# Patient Record
Sex: FEMALE | Race: WHITE | NOT HISPANIC OR LATINO | Employment: OTHER | URBAN - METROPOLITAN AREA
[De-identification: names, ages, dates, MRNs, and addresses within clinical notes are randomized per-mention and may not be internally consistent; named-entity substitution may affect disease eponyms.]

---

## 2017-01-11 ENCOUNTER — ALLSCRIPTS OFFICE VISIT (OUTPATIENT)
Dept: OTHER | Facility: OTHER | Age: 42
End: 2017-01-11

## 2017-05-03 ENCOUNTER — LAB REQUISITION (OUTPATIENT)
Dept: LAB | Facility: HOSPITAL | Age: 42
End: 2017-05-03
Payer: MEDICARE

## 2017-05-03 ENCOUNTER — ALLSCRIPTS OFFICE VISIT (OUTPATIENT)
Dept: OTHER | Facility: OTHER | Age: 42
End: 2017-05-03

## 2017-05-03 DIAGNOSIS — R10.2 PELVIC AND PERINEAL PAIN: ICD-10-CM

## 2017-05-03 DIAGNOSIS — Z01.419 ENCOUNTER FOR GYNECOLOGICAL EXAMINATION WITHOUT ABNORMAL FINDING: ICD-10-CM

## 2017-05-03 DIAGNOSIS — N64.4 MASTODYNIA: ICD-10-CM

## 2017-05-03 DIAGNOSIS — Z12.31 ENCOUNTER FOR SCREENING MAMMOGRAM FOR MALIGNANT NEOPLASM OF BREAST: ICD-10-CM

## 2017-05-03 PROCEDURE — G0145 SCR C/V CYTO,THINLAYER,RESCR: HCPCS | Performed by: OBSTETRICS & GYNECOLOGY

## 2017-05-09 ENCOUNTER — ALLSCRIPTS OFFICE VISIT (OUTPATIENT)
Dept: OTHER | Facility: OTHER | Age: 42
End: 2017-05-09

## 2017-05-11 LAB
LAB AP GYN PRIMARY INTERPRETATION: NORMAL
LAB AP LMP: NORMAL
Lab: NORMAL
PATH INTERP SPEC-IMP: NORMAL

## 2017-11-03 ENCOUNTER — GENERIC CONVERSION - ENCOUNTER (OUTPATIENT)
Dept: OTHER | Facility: OTHER | Age: 42
End: 2017-11-03

## 2017-11-09 ENCOUNTER — LAB REQUISITION (OUTPATIENT)
Dept: LAB | Facility: HOSPITAL | Age: 42
End: 2017-11-09
Payer: MEDICARE

## 2017-11-09 ENCOUNTER — ALLSCRIPTS OFFICE VISIT (OUTPATIENT)
Dept: OTHER | Facility: OTHER | Age: 42
End: 2017-11-09

## 2017-11-09 DIAGNOSIS — R87.612 LOW GRADE SQUAMOUS INTRAEPITHELIAL LESION ON CYTOLOGIC SMEAR OF CERVIX (LGSIL): ICD-10-CM

## 2017-11-09 PROCEDURE — G0145 SCR C/V CYTO,THINLAYER,RESCR: HCPCS | Performed by: OBSTETRICS & GYNECOLOGY

## 2017-11-11 NOTE — PROGRESS NOTES
Assessment    1  Female pelvic pain (625 9) (R10 2)   2  LGSIL on Pap smear of cervix (795 03) (R87 447)   3  Encounter for routine gynecological examination (V72 31) (Z01 419)    Plan  Encounter for routine gynecological examination    · Follow-up visit in 6 months Evaluation and Treatment  Follow-up  Status: Hold For -Scheduling  Requested for: 58VCY0072   Ordered;Encounter for routine gynecological examination; Ordered By: Nichol Alfred Performed:  Due: 27IJH4176  LGSIL on Pap smear of cervix    · (1) THIN PREP PAP WITH IMAGING; Status: In Progress - Specimen/DataCollected,Retrospective By Protocol Authorization;   Done: [id number]   Perform:[facility] In Office Collection; Order Comments:Cervical/Endocervical; Last Updated By:Bennie Wilkinson; 11/9/2017 11:40:39 AM;Ordered;on Pap smear of cervix; Ordered By:Lynsey De Leon; Maturation index required? : No  : 11/03/2017  HPV? : Not Requested    Discussion/Summary  Discussion Summary:   TOPIC: ABNORMAL PAP SMEAR & PELVIC PAIN    Normal GYN Exam  check f/u PELVIC US to evaluate the pelvis and r/o Ovarian Cysts    Pap SMEar DOne    Follow-up Visit in 6 months  if any problems develop during the interim  questions were answered  Goals and Barriers: The patient has the current Goals: GOod GYN Health  CHeck Pelvic PAin  None  Patient's Capacity to Self-Care: Patient is able to Self-Care  Patient Education: Evaluate Pelvic PAin and F/u Pap SMear  Medication SE Review and Pt Understands Tx: The treatment plan was reviewed with the patient/guardian   The patient/guardian understands and agrees with the treatment plan   Self Referrals:   Self Referrals: No      Chief Complaint    1  Pelvic Pain  Chief Complaint Free Text Note Form: Acute Visitmonth pap LGSILc/o pelvic pain, hx of ovarian cyst      History of Present Illness  HPI: Patient reporting intermittent Pelvic Discomfort    Had a GI Evaluation and nothing was found    Suggestions that there were probably pelvic adhesions present    Periods are fairly predictable    No erratic bleeding noted  For follow-up Pap SMear Today as well      Active Problems    1  Abnormal mammogram (793 80) (R92 8)   2  Abnormal uterine bleeding (AUB) (626 9) (N93 9)   3  Anxiety (300 00) (F41 9)   4  ASCUS favoring benign (796 9)   5  ASCUS with positive high risk HPV (796 9)   6  Breast pain (611 71) (N64 4)   7  Burning Sensation During Urination   8  Burning with urination (788 1) (R30 0)   9  Candidiasis (112 9) (B37 9)   10  Encounter for routine gynecological examination (V72 31) (Z01 419)   11  Endometrial thickening on ultra sound (793 5) (R93 8)   12  Fatigue (780 79) (R53 83)   13  Female pelvic pain (625 9) (R10 2)   14  Galactorrhea not associated with childbirth (611 6) (N64 3)   15  Hirsutism (704 1) (L68 0)   16  Irregular menstrual cycle (626 4) (N92 6)   17  LGSIL on Pap smear of cervix (795 03) (R87 612)   18  Mastodynia (611 71) (N64 4)   19  Microhematuria (599 72) (R31 29)   20  Nipple discharge (611 79) (N64 52)   21  Ovarian cyst (620 2) (N83 20)   22  Pap smear abnormality of cervix with ASCUS favoring benign (795 01) (R87 610)   23  Pap smear, as part of routine gynecological examination (V76 2) (Z01 419)   24  Papillomavirus as the cause of diseases classified to other chapters (079 4) (B97 7)   25  Pelvic inflammatory disease (614 9) (N73 9)   26  Polycystic ovaries (256 4) (E28 2)   27  Premenstrual symptom (625 4) (N94 3)   28  Prolonged menstrual cycle (626 2) (N92 1)   29  Routine Gynecological Exam With Cervical Pap Smear   30  Screening for osteoporosis (V82 81) (Z13 820)   31  Screening mammogram for high-risk patient (V76 11) (Z12 31)   32  Sebaceous cyst (706 2) (L72 3)   33  Trichomoniasis (131 9) (A59 9)   34  Urinary frequency (788 41) (R35 0)   35  Urinary symptom or sign (788 99) (R39 9)   36  UTI symptoms (788 99) (R39 9)   37  Vaginal burning (625 8) (N94 9)   38   Vaginal discharge (623  5) (N89 8)   39  Vaginal itching (698 1) (L29 8)   40  Vaginal Itching (625 8)   41  Visit for screening mammogram (V76 12) (Z12 31)    Past Medical History    1  History of Encounter for routine gynecological examination (2 31) (Z01 419)   2  History of Encounter for routine gynecological examination (V72 31) (Z01 419)   3  History of Encounter for screening mammogram for malignant neoplasm of breast (V76 12) (Z12 31)   4  History of gastroesophageal reflux (GERD) (V12 79) (Z87 19)   5  History of human papillomavirus infection (V12 09) (Z86 19)   6  History of hypertension (V12 59) (Z86 79)   7  History of migraine (V12 49) (Z86 69)   8  History of thrombocytopenia (V12 3) (Z86 2)   9  History of vaginal discharge (V13 29) (Z87 42)   10  History of Human Papilloma Virus Infection Type 16 (079 4)   11  History of Ovarian cyst (620 2) (N83 20)   12  History of Pap smear, as part of routine gynecological examination (V76 2) (Z01 419)   13  History of Reported Positive Pap Smear (795 19)   14  History of Vaginal discharge (623 5) (N89 8)   15  History of Visit for screening mammogram (V76 12) (Z12 31)    Surgical History  1  History of Ankle Surgery   2  History of Breast Surgery Puncture Aspiration Of Cyst Right Breast   3  History of  Section   4  History of Cholecystectomy   5  History of Colposcopy Cervix With Biopsy(S) With Endocervical Curettage   6  History of Complete Colonoscopy   7  History of Dilation And Curettage   8  History of Excision For Hidradenitis   9  History of Incisional Breast Biopsy   10  History of Knee Arthroscopy (Therapeutic)   11  History of Oral Surgery Tooth Extraction   12  History of Salpingectomy For Ectopic Pregnancy   13  History of Tonsillectomy    Family History  Mother    1  Family history of arthritis (V17 7) (Z82 61)   2  Family history of asthma (V17 5) (Z82 5)   3  Family history of Crohn's disease (V18 59) (Z83 79)   4   Family history of hypertension (V17 49) (Z82 49)   5  Family history of hypothyroidism (V18 19) (Z83 49)  Father    10  Family history of Diabetes Mellitus (V18 0)   7  Family history of arthritis (V17 7) (Z82 61)   8  Family history of asthma (V17 5) (Z82 5)   9  Family history of hypertension (V17 49) (Z82 49)   10  Family history of Heart Disease (V17 49)  Daughter    6  Family history of polycystic ovarian syndrome (V18 7) (Z84 2)  Maternal Grandmother    12  Family history of Osteoporosis (V17 81)  Paternal Grandmother    15  Family history of Diabetes Mellitus (V18 0)   14  Family history of Heart Disease (V17 49)  Paternal Grandfather    13  Family history of Diabetes Mellitus (V18 0)   16  Family history of Heart Disease (V17 49)  Maternal Aunt    16  Family history of Breast Cancer (V16 3)   18  Family history of Breast Cancer (V16 3)  Paternal Uncle    23  Family history of Gastric Cancer (V16 0)   20  Family history of Heart Disease (V17 49)    Social History     · Being A Social Drinker   · Caffeine use   · Never A Smoker   · No drug use    Current Meds   1  Aciphex 20 MG Oral Tablet Delayed Release; Therapy: (Recorded:01Jul2013) to Recorded   2  Cambia PACK; Therapy: (Nery Roughen) to Recorded   3  Clindamycin Phosphate 1 % External Gel; Therapy: (Recorded:15Mar2016) to Recorded   4  Estrace 0 1 MG/GM Vaginal Cream; 1/4 applicator twice a week; Therapy: 65CIV8043 to (Last Rx:80Rat9625)  Requested for: 58Wbb4029 Ordered   5  Flexeril 10 MG TABS; Therapy: (Recorded:15Mar2016) to Recorded   6  Iron 65 MG TABS; Therapy: (Benay Roughen) to Recorded   7  Loratadine 10 MG Oral Tablet; Therapy: (Recorded:15Mar2016) to Recorded   8  Magnesium 500 MG Oral Capsule; Therapy: (Recorded:29Fjo1070) to Recorded   9  MetFORMIN HCl - 500 MG Oral Tablet; Therapy: (([phone] [phone]) to Recorded   10  Multivitamins TABS; Therapy: (Benay Roughen) to Recorded   11  Phentermine HCl - 37 5 MG Oral Capsule;   Therapy: (Recorded:15Mar2016) to Recorded   12  Sertraline HCl - 50 MG Oral Tablet; TAKE 1 TABLET DAILY; Therapy: 83GXE9013 to (Priscilla Keating)  Requested for: 75PKU2201; Last  Rx:66Aow8300 Ordered   13  Topiramate 50 MG Oral Tablet; Therapy: (Recorded:64Oje3491) to Recorded   14  Vitamin B-12 1000 MCG Oral Tablet; Therapy: (Maya La Coste) to Recorded   15  Zoloft 25 MG Oral Tablet; TAKE 1 TABLET DAILY; Therapy: 10MJC0542 to (Evaluate:37Koh7917)  Requested for: 61AYE4837; Last  Rx:02Fgg6330 Ordered    Allergies  1  Cipro SOLN   2  Keflex CAPS   3  Ceclor CAPS   4  Codeine Derivatives   5  Compazine TABS   6  Demerol TABS   7  Percocet TABS   8  Sulfa Drugs  9  Latex   10  Adhesive Tape    Vitals  Vital Signs    Recorded: 14STE6365 76:48DI   Systolic 043   Diastolic 74   Height 5 ft 8 in   Weight 239 lb 6 oz   BMI Calculated 36 4   BSA Calculated 2 21   LMP 1103/2017       Physical Exam   Constitutional  General appearance: No acute distress, well appearing and well nourished  Genitourinary  External genitalia: Normal and no lesions appreciated  Vagina: Normal, no lesions or dryness appreciated  Urethra: Normal    Urethral meatus: Normal    Bladder: Normal, soft, non-tender and no prolapse or masses appreciated  Cervix: Normal, no palpable masses  Uterus: Normal, non-tender, not enlarged, and no palpable masses  Adnexa/parametria: Normal, non-tender and no fullness or masses appreciated  Psychiatric  Orientation to person, place, and time: Normal    Mood and affect: Normal        Provider Comments  Provider Comments: To [place] today    Son doing well at [facility]    Date/Time Provider Specialty Site   05/09/2018 03:30 PM JONATHAN Thorpe   Obstetrics/Gynecology [facility]       Signatures   Electronically signed by : JONATHAN Neely ; Nov 10 2017 12:37PM EST                       (Author)

## 2017-11-13 NOTE — PROCEDURES
Active Problems     1  Abnormal mammogram (793 80) (R92 8)   2  Abnormal uterine bleeding (AUB) (626 9) (N93 9)   3  Anxiety (300 00) (F41 9)   4  ASCUS favoring benign (796 9)   5  ASCUS with positive high risk HPV (796 9)   6  Breast pain (611 71) (N64 4)   7  Burning Sensation During Urination   8  Burning with urination (788 1) (R30 0)   9  Candidiasis (112 9) (B37 9)   10  Endometrial thickening on ultra sound (793 5) (R93 8)   11  Fatigue (780 79) (R53 83)   12  Galactorrhea not associated with childbirth (611 6) (N64 3)   13  Hirsutism (704 1) (L68 0)   14  Irregular menstrual cycle (626 4) (N92 6)   15  Mastodynia (611 71) (N64 4)   16  Microhematuria (599 72) (R31 29)   17  Nipple discharge (611 79) (N64 52)   18  Ovarian cyst (620 2) (N83 20)   19  Pap smear abnormality of cervix with ASCUS favoring benign (795 01) (R87 610)   20  Pap smear, as part of routine gynecological examination (V76 2) (Z01 419)   21  Pelvic inflammatory disease (614 9) (N73 9)   22  Polycystic ovaries (256 4) (E28 2)   23  Premenstrual symptom (625 4) (N94 3)   24  Prolonged menstrual cycle (626 2) (N92 1)   25  Routine Gynecological Exam With Cervical Pap Smear   26  Screening for osteoporosis (V82 81) (Z13 820)   27  Screening mammogram for high-risk patient (V76 11) (Z12 31)   28  Sebaceous cyst (706 2) (L72 3)   29  Trichomoniasis (131 9) (A59 9)   30  Urinary frequency (788 41) (R35 0)   31  Urinary symptom or sign (788 99) (R39 9)   32  UTI symptoms (788 99) (R39 9)   33  Vaginal burning (625 8) (N94 9)   34  Vaginal discharge (623 5) (N89 8)   35  Vaginal itching (698 1) (L29 8)   36  Vaginal Itching (625 8)   37   Visit for screening mammogram (V76 12) (Z12 31)  Female pelvic pain (625 9) (R10 2)     Papillomavirus as the cause of diseases classified to other chapters (079 4) (B97 7)     Encounter for routine gynecological examination (V72 31) (Z01 419)     LGSIL on Pap smear of cervix (795 03) (R66 372)       Current Meds    1  Sertraline HCl - 50 MG Oral Tablet; TAKE 1 TABLET DAILY; Therapy: 10REF3777 to (Vicente Estrada)  Requested for: 27CLU4271; Last NB:44PQA3633 Ordered   2  Zoloft 25 MG Oral Tablet; TAKE 1 TABLET DAILY; Therapy: 75WPV8591 to (Evaluate:55Xjk9323)  Requested for: 67YPA0390; Last Rx:17Jan2017 Ordered    3  Estrace 0 1 MG/GM Vaginal Cream; 1/4 applicator twice a week; Therapy: 55STR9108 to (Last Rx:81Hcf8096)  Requested for: 21Dec2016 Ordered    4  Aciphex 20 MG Oral Tablet Delayed Release; Therapy: (Recorded:01Jul2013) to Recorded   5  Cambia PACK; Therapy: (Bridger Siskin) to Recorded   6  Clindamycin Phosphate 1 % External Gel; Therapy: (Recorded:15Mar2016) to Recorded   7  Flexeril 10 MG TABS; Therapy: (Recorded:15Mar2016) to Recorded   8  Iron 65 MG TABS; Therapy: (Millcreek Siskin) to Recorded   9  Loratadine 10 MG Oral Tablet; Therapy: (Recorded:15Mar2016) to Recorded   10  Magnesium 500 MG Oral Capsule; Therapy: (Recorded:01Jul2013) to Recorded   11  MetFORMIN HCl - 500 MG Oral Tablet; Therapy: ([id number]) to Recorded   12  Multivitamins TABS; Therapy: (Millcreek Siskin) to Recorded   13  Phentermine HCl - 37 5 MG Oral Capsule; Therapy: (Recorded:15Mar2016) to Recorded   14  Topiramate 50 MG Oral Tablet; Therapy: (Recorded:01Jul2013) to Recorded   15  Vitamin B-12 1000 MCG Oral Tablet; Therapy: (Recorded:23Apr2015) to Recorded    Allergies  1  Cipro SOLN   2  Keflex CAPS   3  Ceclor CAPS   4  Codeine Derivatives   5  Compazine TABS   6  Demerol TABS   7  Percocet TABS   8  Sulfa Drugs    9  Latex   10  Adhesive Tape    Results/Data  Transvaginal Ultrasound:  Procedure: The study was done today in the office  Indication: Pain  Findings:  Uterus: leiomyoma--   UT=88 x 66 x 55 mm  Ovaries:  RT OV=29 x 26 x 15 mm,LT OV=24 x 22 x 19 mm  Endometrium:  5mm  Cervix:  WNL  Pelvic Stuctures:  WNL  Impression:  Small Fibroidsx 22 x 21 mmx 23 x 23 mm    Patient Status: the patient tolerated the procedure well  Complications:  there were no complications  Future Appointments    Date/Time Provider Specialty Site   05/09/2018 03:30 PM JONATHAN Raphael   Obstetrics/Gynecology [facility]       Signatures   Electronically signed by : Jann Acosta, ; Nov 9 2017 12:47PM EST                       (Author)    Electronically signed by : JONATHAN Verde ; Nov 12 2017  3:21PM EST                       (Author)

## 2018-01-12 NOTE — MISCELLANEOUS
Message  Spoke with patient regarding culture results  All negative        Signatures   Electronically signed by : Severo Duran, [facility]; Oct 18 2016  3:22PM EST                       (Author)

## 2018-01-13 VITALS
DIASTOLIC BLOOD PRESSURE: 76 MMHG | SYSTOLIC BLOOD PRESSURE: 102 MMHG | BODY MASS INDEX: 33.83 KG/M2 | HEIGHT: 68 IN | WEIGHT: 223.25 LBS

## 2018-01-13 VITALS
DIASTOLIC BLOOD PRESSURE: 88 MMHG | HEIGHT: 68 IN | SYSTOLIC BLOOD PRESSURE: 122 MMHG | WEIGHT: 221 LBS | BODY MASS INDEX: 33.49 KG/M2

## 2018-01-13 NOTE — MISCELLANEOUS
Message  Spoke with patient regarding culture results  All negative  Patient states she is still having discharge and dark urine  She is nearly finished her course of metronidazole  Recommended follow-up with PCP        Signatures   Electronically signed by : ADRIÁN Ibanez; Mar 25 2016 10:37AM EST                       (Author)

## 2018-01-13 NOTE — MISCELLANEOUS
Message  Spoke with patient regarding culture results  Positive for yeast infection  Patient was given rx for Diflucan on 8/9  She is to call if symptoms return  Signatures   Electronically signed by : Getachew Syed [facility];  Aug 16 2016  9:27AM EST                       (Author)

## 2018-01-14 VITALS
BODY MASS INDEX: 36.28 KG/M2 | HEIGHT: 68 IN | DIASTOLIC BLOOD PRESSURE: 74 MMHG | SYSTOLIC BLOOD PRESSURE: 118 MMHG | WEIGHT: 239.38 LBS

## 2018-01-16 NOTE — MISCELLANEOUS
Message  Spoke with patient regarding culture results  All negative  Patient to make appt with [facility]        Signatures   Electronically signed by : Leonard Dawson [facility]; May  3 2016 11:39AM EST                       (Author)

## 2018-01-18 NOTE — MISCELLANEOUS
Message  Patient called regarding her Zoloft rx  States she has been on 25 mg for about a month now, and it does not seem to be helping with her anxiety and PMS symptoms  Per Dr Marley Rocha, dose to be increased to 50 mg daily  Patient recently picked up a refill of the 25 mg medication  She is to take 2 25 mg pills once a day, then start new rx   Has appt 4/7 for yearly GYN exam       Plan  Premenstrual symptom    · Sertraline HCl - 25 MG Oral Tablet   · Sertraline HCl - 50 MG Oral Tablet; TAKE 1 TABLET DAILY AS DIRECTED    Signatures   Electronically signed by : Horace Crigler, PAC; Feb 17 2016  3:20PM EST                       (Author)

## 2018-02-02 ENCOUNTER — OFFICE VISIT (OUTPATIENT)
Dept: OBGYN CLINIC | Facility: CLINIC | Age: 43
End: 2018-02-02
Payer: MEDICARE

## 2018-02-02 ENCOUNTER — APPOINTMENT (OUTPATIENT)
Dept: LAB | Facility: HOSPITAL | Age: 43
End: 2018-02-02
Attending: OBSTETRICS & GYNECOLOGY
Payer: MEDICARE

## 2018-02-02 VITALS
DIASTOLIC BLOOD PRESSURE: 86 MMHG | BODY MASS INDEX: 34.1 KG/M2 | HEIGHT: 68 IN | SYSTOLIC BLOOD PRESSURE: 132 MMHG | WEIGHT: 225 LBS

## 2018-02-02 DIAGNOSIS — Z11.3 SCREENING FOR STD (SEXUALLY TRANSMITTED DISEASE): ICD-10-CM

## 2018-02-02 DIAGNOSIS — Z11.3 SCREENING EXAMINATION FOR STD (SEXUALLY TRANSMITTED DISEASE): Primary | ICD-10-CM

## 2018-02-02 PROCEDURE — 87491 CHLMYD TRACH DNA AMP PROBE: CPT | Performed by: OBSTETRICS & GYNECOLOGY

## 2018-02-02 PROCEDURE — 87591 N.GONORRHOEAE DNA AMP PROB: CPT | Performed by: OBSTETRICS & GYNECOLOGY

## 2018-02-02 PROCEDURE — 86592 SYPHILIS TEST NON-TREP QUAL: CPT

## 2018-02-02 PROCEDURE — 36415 COLL VENOUS BLD VENIPUNCTURE: CPT

## 2018-02-02 PROCEDURE — 80074 ACUTE HEPATITIS PANEL: CPT

## 2018-02-02 PROCEDURE — 87070 CULTURE OTHR SPECIMN AEROBIC: CPT | Performed by: OBSTETRICS & GYNECOLOGY

## 2018-02-02 PROCEDURE — 99214 OFFICE O/P EST MOD 30 MIN: CPT | Performed by: OBSTETRICS & GYNECOLOGY

## 2018-02-02 PROCEDURE — 87389 HIV-1 AG W/HIV-1&-2 AB AG IA: CPT

## 2018-02-02 RX ORDER — FLUTICASONE PROPIONATE 50 MCG
SPRAY, SUSPENSION (ML) NASAL
Refills: 5 | COMMUNITY
Start: 2017-11-19

## 2018-02-02 RX ORDER — RABEPRAZOLE SODIUM 20 MG/1
20 TABLET, DELAYED RELEASE ORAL
COMMUNITY
End: 2020-09-02

## 2018-02-02 RX ORDER — MENTHOL 5.8 MG/1
1 LOZENGE ORAL DAILY
COMMUNITY
End: 2021-02-04

## 2018-02-02 RX ORDER — BUPROPION HYDROCHLORIDE 300 MG/1
150 TABLET ORAL EVERY MORNING
Refills: 1 | COMMUNITY
Start: 2018-01-15

## 2018-02-02 RX ORDER — ESTRADIOL 0.1 MG/G
CREAM VAGINAL
COMMUNITY
Start: 2016-12-14 | End: 2019-10-23 | Stop reason: SDUPTHER

## 2018-02-02 RX ORDER — ACETAMINOPHEN, ASPIRIN AND CAFFEINE 250; 250; 65 MG/1; MG/1; MG/1
2 TABLET, FILM COATED ORAL EVERY 6 HOURS PRN
COMMUNITY
Start: 2013-04-10 | End: 2021-02-04

## 2018-02-02 RX ORDER — HYDROMORPHONE HYDROCHLORIDE 2 MG/1
TABLET ORAL
Refills: 0 | COMMUNITY
Start: 2018-01-18 | End: 2018-11-02

## 2018-02-02 RX ORDER — FLUOXETINE 10 MG/1
10 CAPSULE ORAL DAILY
Refills: 0 | COMMUNITY
Start: 2017-12-07 | End: 2018-10-18

## 2018-02-02 RX ORDER — CYCLOBENZAPRINE HCL 10 MG
TABLET ORAL
Refills: 3 | COMMUNITY
Start: 2017-12-21

## 2018-02-02 RX ORDER — LORATADINE 10 MG/1
10 TABLET ORAL AS NEEDED
COMMUNITY
End: 2021-02-04

## 2018-02-02 RX ORDER — BUPROPION HYDROCHLORIDE 300 MG/1
300 TABLET ORAL
COMMUNITY
End: 2018-05-15

## 2018-02-02 RX ORDER — METFORMIN HYDROCHLORIDE 500 MG/1
TABLET, EXTENDED RELEASE ORAL
COMMUNITY
Start: 2018-01-29 | End: 2019-10-23 | Stop reason: ALTCHOICE

## 2018-02-02 RX ORDER — CLINDAMYCIN PHOSPHATE 10 MG/G
GEL TOPICAL
COMMUNITY
Start: 2015-08-17

## 2018-02-02 RX ORDER — TOPIRAMATE 25 MG/1
TABLET ORAL
Refills: 3 | COMMUNITY
Start: 2018-01-01 | End: 2018-10-18

## 2018-02-02 RX ORDER — TOPIRAMATE 25 MG/1
100 TABLET ORAL
COMMUNITY
Start: 2014-03-22 | End: 2018-05-15

## 2018-02-02 RX ORDER — CLINDAMYCIN HYDROCHLORIDE 300 MG/1
CAPSULE ORAL
Refills: 0 | COMMUNITY
Start: 2017-12-04 | End: 2019-10-23 | Stop reason: ALTCHOICE

## 2018-02-02 RX ORDER — TOPIRAMATE 100 MG/1
100 TABLET, FILM COATED ORAL 2 TIMES DAILY
Refills: 5 | COMMUNITY
Start: 2018-01-02 | End: 2018-10-08 | Stop reason: SDUPTHER

## 2018-02-02 NOTE — PATIENT INSTRUCTIONS
The results of that today's visit revealed a normal pelvic and gynecologic examination  Because of the history which the patient relayed to me, we obtained complete set of vaginal cultures as well as appropriate blood work which will provide a complete STD evaluation  I also reviewed the results of her skin biopsy and was able to reassure her that it was completely normal revealing normal cici and no evidence of any gonorrhea  I told the patient that we will see her in late spring or early summer for her routine annual gynecologic evaluation  She was told that office if any problems develop during the interim  All questions were answered in detail for her

## 2018-02-02 NOTE — PROGRESS NOTES
Assessment/Plan:      Diagnoses and all orders for this visit:    Screening examination for STD (sexually transmitted disease)    Encounter for gynecological examination    Other orders  -     RABEprazole (ACIPHEX) 20 MG tablet; Take by mouth  -     loteprednol (ALREX) 0 2 % SUSP; Apply 1 drop to eye  -     aspirin-acetaminophen-caffeine (EXCEDRIN MIGRAINE) 250-250-65 MG per tablet; Take 2 tablets by mouth  -     buPROPion (WELLBUTRIN XL) 300 mg 24 hr tablet; Take 300 mg by mouth  -     buPROPion (WELLBUTRIN XL) 300 mg 24 hr tablet; Take 300 mg by mouth daily  -     Diclofenac Potassium (CAMBIA) 50 MG PACK; Take by mouth  -     clindamycin (CLINDAGEL) 1 % gel;   -     clindamycin (CLEOCIN) 300 MG capsule; TAKE 2 CAPSULES BY MOUTH 1 HOUR BEFORE SURGERY  -     cyclobenzaprine (FLEXERIL) 10 mg tablet; TAKE 1 TABLET BY MOUTH TWICE A DAY AS NEEDED FOR SPASMS  -     Diclofenac Sodium  MG 24 hr tablet; Take 100 mg by mouth daily  -     estradiol (ESTRACE VAGINAL) 0 1 mg/g vaginal cream; Insert into the vagina every other day  -     FLUoxetine (PROzac) 10 mg capsule; Take 10 mg by mouth daily  -     fluticasone (FLONASE) 50 mcg/act nasal spray; USE 1 SPRAY IN EACH NOSTRIL DAILY  -     HYDROmorphone (DILAUDID) 2 mg tablet; TAKE 1 TABLET BY MOUTH AT BEDTIME AND 1 TABLET AT 3AM IF NEEDED FOR PAIN  -     loratadine (CLARITIN) 10 mg tablet; Take by mouth  -     metFORMIN (GLUCOPHAGE-XR) 500 mg 24 hr tablet; Start one tab after dinner  Add tab after breakfast Add 2nd tab after dinner, 2nd tab after breakfast  Goal is two 500 mg tablets 2x/day  -     Multiple Vitamins-Iron (QC DAILY MULTIVITAMINS/IRON) TABS; Take by mouth  -     topiramate (TOPAMAX) 100 mg tablet; Take 100 mg by mouth 2 (two) times a day  -     topiramate (TOPAMAX) 25 mg tablet; Take 100 mg by mouth  -     topiramate (TOPAMAX) 25 mg tablet; TAKE 1 TABLET (25 MG TOTAL) BY MOUTH 2 (TWO) TIMES A DAY   ALONG WITH 100MG TABS          Subjective:     Patient ID: Emely Uribe is a 38 y o  female  evaluation  Exposure to STD          Review of Systems   Constitutional: Negative  HENT: Negative  Eyes: Negative  Respiratory: Negative  Cardiovascular: Negative  Gastrointestinal: Negative  Endocrine: Negative  Genitourinary: Negative  Musculoskeletal: Negative  Skin: Negative  Neurological: Negative  Psychiatric/Behavioral: Negative  Objective:     Physical Exam   Constitutional: She is oriented to person, place, and time  She appears well-developed and well-nourished  HENT:   Head: Normocephalic  Pulmonary/Chest: Effort normal    Abdominal: Soft  Genitourinary: Vagina normal and uterus normal    Genitourinary Comments: Pelvic examination revealed no masses or pelvic tenderness  Musculoskeletal: Normal range of motion  Neurological: She is alert and oriented to person, place, and time  Skin:   Normal skin  Psychiatric: She has a normal mood and affect   Her behavior is normal  Judgment and thought content normal

## 2018-02-03 LAB
HAV IGM SER QL: NORMAL
HBV CORE IGM SER QL: NORMAL
HBV SURFACE AG SER QL: NORMAL
HCV AB SER QL: NORMAL

## 2018-02-04 LAB — BACTERIA GENITAL AEROBE CULT: NORMAL

## 2018-02-05 LAB
HIV 1+2 AB+HIV1 P24 AG SERPL QL IA: NORMAL
RPR SER QL: NORMAL

## 2018-02-06 ENCOUNTER — TELEPHONE (OUTPATIENT)
Dept: OBGYN CLINIC | Facility: CLINIC | Age: 43
End: 2018-02-06

## 2018-02-06 LAB
CHLAMYDIA DNA CVX QL NAA+PROBE: NORMAL
N GONORRHOEA DNA GENITAL QL NAA+PROBE: NORMAL

## 2018-02-06 NOTE — TELEPHONE ENCOUNTER
----- Message from Sita Demarco MD sent at 2/5/2018 12:49 PM EST -----  Hepatitis Panel and RPR were NEGATIVE           Vaginal Culture was NEGATIVE    Thanks

## 2018-02-06 NOTE — TELEPHONE ENCOUNTER
----- Message from Israel Duval MD sent at 2/6/2018 12:52 PM EST -----  HIV is NEGATIVE        Thanks

## 2018-02-08 ENCOUNTER — TELEPHONE (OUTPATIENT)
Dept: OBGYN CLINIC | Facility: CLINIC | Age: 43
End: 2018-02-08

## 2018-02-08 NOTE — TELEPHONE ENCOUNTER
----- Message from Jackson Cole MD sent at 2/7/2018  7:56 AM EST -----  Final cultures were NEGATIVE    Thanks

## 2018-05-15 ENCOUNTER — ANNUAL EXAM (OUTPATIENT)
Dept: OBGYN CLINIC | Facility: CLINIC | Age: 43
End: 2018-05-15
Payer: MEDICARE

## 2018-05-15 VITALS
SYSTOLIC BLOOD PRESSURE: 122 MMHG | DIASTOLIC BLOOD PRESSURE: 78 MMHG | BODY MASS INDEX: 31.7 KG/M2 | HEIGHT: 69 IN | WEIGHT: 214 LBS

## 2018-05-15 DIAGNOSIS — N64.4 MASTODYNIA OF LEFT BREAST: ICD-10-CM

## 2018-05-15 DIAGNOSIS — Z12.39 BREAST CANCER SCREENING: ICD-10-CM

## 2018-05-15 DIAGNOSIS — R35.0 URINARY FREQUENCY: ICD-10-CM

## 2018-05-15 DIAGNOSIS — Z01.419 PAP SMEAR, AS PART OF ROUTINE GYNECOLOGICAL EXAMINATION: ICD-10-CM

## 2018-05-15 DIAGNOSIS — Z01.419 ENCOUNTER FOR GYNECOLOGICAL EXAMINATION: Primary | ICD-10-CM

## 2018-05-15 PROCEDURE — G0101 CA SCREEN;PELVIC/BREAST EXAM: HCPCS | Performed by: OBSTETRICS & GYNECOLOGY

## 2018-05-15 NOTE — PROGRESS NOTES
Assessment/Plan:   Diagnoses and all orders for this visit:    Encounter for gynecological examination  · Normal gynecological exam   · Pap performed today  · Encourage patient to continue healthy diet and exercise  · Patient to follow up in 1 year for routine annual gynecological exam or sooner if any new worsening issues arise    Pap smear, as part of routine gynecological examination  -     Liquid-based pap, screening    Breast cancer screening  -     Mammo screening bilateral w cad; Future  - encouraged patient to perform self-breast exams monthly when she has her menses and look for marble like masses    Mastodynia of left breast  · Patient has a history of fibrocystic changes  · She is an avid coffee drinker and states she is not very concerned  · Mammogram in October was normal     Urinary frequency  · Patient denies dysuria or urgency  Frequency has been present for months  · Encouraged patient to see urologist for workup of possible overactive bladder  Subjective:      Patient ID: Chinmay Hinojosa is a 38 y o  female  Gunner Cullen is a 49-year-old female with a significant past medical history of hypertension, diabetes and migraines who presents to the office today for routine gynecological exam   Patient has been doing well since her last visit a year ago  Patient has her periods regularly every 28 days and states the last for 5-6 days with very heavy bleeding the 1st few days, soaking through a super plus tampon and pad within 2 hours  She states this is normal for her and has been this way her entire life and denies any changes in her follow-up a she denies any vaginal itching, dryness, irritation or pain  Patient states she has frequency, and goes about every hour to the bathroom to urinate  She denies any dysuria or urgency, fever, chills or back pain and states she has had this issue for a few months  She has never seen a urologist for this before    She complains of constipation and some occasional rectal bleeding  She had a colonoscopy a few years ago and is due for 1 in the upcoming year  She reports some left-sided breast tenderness and dull pain that was present 2 weeks ago with her period and has not resolved  She states she is an avid coffee drinker and knows this may aggravate her fibrocystic breast changes but states this is concerning to her  She had her last mammogram in October was normal but patient states this is tender now  She denies any skin changes or nipple discharge or tenderness with movement  She denies any trauma to the area  She also denies performing regular self-breast exams but has not noticed any significant changes  The following portions of the patient's history were reviewed and updated as appropriate: allergies, current medications, past family history, past medical history, past social history, past surgical history and problem list     Review of Systems   Constitutional: Negative  HENT: Negative  Eyes: Negative  Respiratory: Negative  Cardiovascular: Negative  Gastrointestinal: Positive for anal bleeding and constipation  Negative for abdominal distention, abdominal pain, blood in stool, diarrhea, nausea, rectal pain and vomiting  Endocrine: Negative  Genitourinary: Positive for frequency and menstrual problem (Heavy menses)  Negative for decreased urine volume, difficulty urinating, dyspareunia, dysuria, enuresis, flank pain, genital sores, hematuria, pelvic pain, urgency, vaginal bleeding, vaginal discharge and vaginal pain  Musculoskeletal: Negative  Allergic/Immunologic: Negative  Neurological: Negative  Hematological: Bruises/bleeds easily (On chronic aspirin)  Psychiatric/Behavioral: Negative            Objective:      /78 (BP Location: Left arm, Patient Position: Sitting, Cuff Size: Standard)   Ht 5' 8 5" (1 74 m)   Wt 97 1 kg (214 lb)   LMP 05/07/2018 (Exact Date)   BMI 32 07 kg/m²          Physical Exam Constitutional: She appears well-developed  HENT:   Head: Normocephalic  Eyes: Pupils are equal, round, and reactive to light  Neck: Normal range of motion  Neck supple  Cardiovascular: Normal rate and regular rhythm  Pulmonary/Chest: Effort normal and breath sounds normal    Abdominal: Soft  Normal appearance and bowel sounds are normal    Genitourinary: Rectum normal, vagina normal and uterus normal  Pelvic exam was performed with patient supine  Right adnexum displays no mass, no tenderness and no fullness  Left adnexum displays no mass, no tenderness and no fullness  Lymphadenopathy:     She has no cervical adenopathy  She has no axillary adenopathy  Right: No inguinal and no supraclavicular adenopathy present  Left: No inguinal and no supraclavicular adenopathy present  Neurological: She is alert  Skin: Skin is intact  No rash noted  Psychiatric: She has a normal mood and affect   Her speech is normal and behavior is normal  Judgment and thought content normal  Cognition and memory are normal

## 2018-05-15 NOTE — PATIENT INSTRUCTIONS
Normal gynecological physical examination  Self-breast examination stressed  Mammogram ordered  Discussed regular exercise, healthy diet, importance of vitamin D and calcium supplements  Discussed importance of sun block use during periods of prolonged sun exposure  Patient will be seen in 1 year for routine gynecologic and medical examination  Patient will call office for any problems, concerns, or issues which may arise during the interim      Patient complain of left breast mastodynia and was given a slip today for a left breast ultrasound for evaluation    Patient also complained of some urinary frequency and we suggested that she see a urologist for further evaluation

## 2018-07-03 ENCOUNTER — OFFICE VISIT (OUTPATIENT)
Dept: OBGYN CLINIC | Facility: CLINIC | Age: 43
End: 2018-07-03
Payer: MEDICARE

## 2018-07-03 VITALS — BODY MASS INDEX: 31.29 KG/M2 | WEIGHT: 208.8 LBS | SYSTOLIC BLOOD PRESSURE: 110 MMHG | DIASTOLIC BLOOD PRESSURE: 74 MMHG

## 2018-07-03 DIAGNOSIS — Z80.41 FAMILY HISTORY OF OVARIAN CANCER: ICD-10-CM

## 2018-07-03 DIAGNOSIS — Z13.79 GENETIC TESTING: Primary | ICD-10-CM

## 2018-07-03 DIAGNOSIS — N89.8 VAGINAL ITCHING: ICD-10-CM

## 2018-07-03 DIAGNOSIS — Z80.3 FAMILY HISTORY OF BREAST CANCER: ICD-10-CM

## 2018-07-03 PROCEDURE — 99214 OFFICE O/P EST MOD 30 MIN: CPT | Performed by: PHYSICIAN ASSISTANT

## 2018-07-03 RX ORDER — FLUCONAZOLE 150 MG/1
150 TABLET ORAL ONCE
Qty: 1 TABLET | Refills: 0 | Status: SHIPPED | OUTPATIENT
Start: 2018-07-03 | End: 2018-07-03

## 2018-07-03 NOTE — PROGRESS NOTES
Assessment/Plan:    No problem-specific Assessment & Plan notes found for this encounter  Diagnoses and all orders for this visit:    Genetic testing    Vaginal itching  -     fluconazole (DIFLUCAN) 150 mg tablet; Take 1 tablet (150 mg total) by mouth once for 1 dose    Family history of ovarian cancer    Family history of breast cancer          Pelvic exam deferred secondary to patient's menstrual cycle  Rx for Diflucan x 1 dose sent to pharmacy  Patient to call if symptoms do not resolve after her period, or with medication  Counseled patient on general population vs  family history vs  known genetic mutation cancer risk  Discussed BRCA, Schuster syndrome, and other mutations tested for in [facility] panel  Counseled patient on autosomal dominant nature of these mutations  Explained possible results - positive, negative, and negative with variant of unknown significance  Educated patient on possible management changes and risk reduction strategies if a positive is found  Explained to patient that a negative does not mean that she will never get cancer, only that she is not at increased cancer risk from these specific mutations  Discussed possible need for family member follow-up as well  Explained [facility] breast cancer RiskScore  Patient given [facility] patient information booklet  Answered all patient's questions  Consent given, and sample obtained  Will be sent to [facility] lab for testing  F/u for results in 4 weeks  Time of visit 30 minutes; >50% spent counseling  Subjective:      Patient ID: Farida Mcadams is a 38 y o  female  Patient is here for genetic testing secondary to family history of ovarian, breast, and stomach cancer  Patient is of white/non- ancestry; she is 2% Ashkenazi [employer]ism  Family is from [place] and [place]  She has no personal history of cancer  One maternal great aunt had ovarian, breast, and uterine cancer in her 46s and 62s    Another maternal great aunt had ovarian cancer  A paternal great uncle had stomach and pancreatic cancer in his 76s  She is currently perla-menopausal   She has never used HRT  She has had 2 benign breast biopsies  Patient complains today of vaginal itching and burning  This has been going on for several days  She recently had a two week course of antibiotics for a sinus infection  Tried OTC Monistat at home for 3 days, but it did not completely relieve symptoms  Has had intercourse with a new partner  Currently has her period, which came 4 days early  Experienced some dark brown, thick discharge at the start  She denies urinary symptoms, pelvic pain, abdominal pain, n/v, and fever/chills  The following portions of the patient's history were reviewed and updated as appropriate: allergies, current medications, past family history, past medical history, past social history, past surgical history and problem list     Review of Systems   Constitutional: Negative for chills and fever  Gastrointestinal: Negative for abdominal distention, abdominal pain, nausea and vomiting  Genitourinary: Negative for difficulty urinating, dysuria, frequency, genital sores, hematuria, menstrual problem, pelvic pain, urgency, vaginal bleeding, vaginal discharge and vaginal pain  Vaginal itching         Objective:      /74   Wt 94 7 kg (208 lb 12 8 oz)   BMI 31 29 kg/m²          Physical Exam   Constitutional: She is oriented to person, place, and time  Vital signs are normal  She appears well-developed and well-nourished  Neurological: She is alert and oriented to person, place, and time  Psychiatric: She has a normal mood and affect  Her behavior is normal  Judgment and thought content normal    Vitals reviewed

## 2018-07-03 NOTE — PATIENT INSTRUCTIONS
Rx for Diflucan 150 mg x 1 dose sent to pharmacy  Call if symptoms do not resolve  F/u in 4 weeks for results

## 2018-07-17 ENCOUNTER — TELEPHONE (OUTPATIENT)
Dept: OBGYN CLINIC | Facility: CLINIC | Age: 43
End: 2018-07-17

## 2018-07-19 ENCOUNTER — TELEPHONE (OUTPATIENT)
Dept: OBGYN CLINIC | Facility: CLINIC | Age: 43
End: 2018-07-19

## 2018-07-19 NOTE — TELEPHONE ENCOUNTER
Spoke with patient regarding genetic testing  Prior authorization forms need to be sent for insurance, and patient signature is needed  She has appointment scheduled for 7/31; will sign at that time  Unsure if Pap was collected on 5/15  Will investigate

## 2018-08-07 ENCOUNTER — OFFICE VISIT (OUTPATIENT)
Dept: OBGYN CLINIC | Facility: CLINIC | Age: 43
End: 2018-08-07
Payer: MEDICARE

## 2018-08-07 VITALS — WEIGHT: 207.4 LBS | SYSTOLIC BLOOD PRESSURE: 110 MMHG | BODY MASS INDEX: 31.08 KG/M2 | DIASTOLIC BLOOD PRESSURE: 74 MMHG

## 2018-08-07 DIAGNOSIS — N89.8 VAGINAL ITCHING: ICD-10-CM

## 2018-08-07 DIAGNOSIS — R30.0 BURNING WITH URINATION: Primary | ICD-10-CM

## 2018-08-07 DIAGNOSIS — N89.8 VAGINAL DISCHARGE: ICD-10-CM

## 2018-08-07 LAB
SL AMB  POCT GLUCOSE, UA: ABNORMAL
SL AMB LEUKOCYTE ESTERASE,UA: ABNORMAL
SL AMB POCT BILIRUBIN,UA: ABNORMAL
SL AMB POCT BLOOD,UA: ABNORMAL
SL AMB POCT CLARITY,UA: ABNORMAL
SL AMB POCT COLOR,UA: YELLOW
SL AMB POCT KETONES,UA: ABNORMAL
SL AMB POCT NITRITE,UA: ABNORMAL
SL AMB POCT PH,UA: 7
SL AMB POCT SPECIFIC GRAVITY,UA: 1.01
SL AMB POCT URINE PROTEIN: 30
SL AMB POCT UROBILINOGEN: 0.2

## 2018-08-07 PROCEDURE — 87086 URINE CULTURE/COLONY COUNT: CPT | Performed by: PHYSICIAN ASSISTANT

## 2018-08-07 PROCEDURE — 87480 CANDIDA DNA DIR PROBE: CPT | Performed by: PHYSICIAN ASSISTANT

## 2018-08-07 PROCEDURE — 87491 CHLMYD TRACH DNA AMP PROBE: CPT | Performed by: PHYSICIAN ASSISTANT

## 2018-08-07 PROCEDURE — 87510 GARDNER VAG DNA DIR PROBE: CPT | Performed by: PHYSICIAN ASSISTANT

## 2018-08-07 PROCEDURE — 99214 OFFICE O/P EST MOD 30 MIN: CPT | Performed by: PHYSICIAN ASSISTANT

## 2018-08-07 PROCEDURE — 81002 URINALYSIS NONAUTO W/O SCOPE: CPT | Performed by: PHYSICIAN ASSISTANT

## 2018-08-07 PROCEDURE — 87591 N.GONORRHOEAE DNA AMP PROB: CPT | Performed by: PHYSICIAN ASSISTANT

## 2018-08-07 PROCEDURE — 87660 TRICHOMONAS VAGIN DIR PROBE: CPT | Performed by: PHYSICIAN ASSISTANT

## 2018-08-07 RX ORDER — AMOXICILLIN 875 MG/1
875 TABLET, COATED ORAL
COMMUNITY
End: 2018-08-10 | Stop reason: ALTCHOICE

## 2018-08-07 RX ORDER — AZITHROMYCIN 250 MG/1
TABLET, FILM COATED ORAL
COMMUNITY
Start: 2016-06-15 | End: 2018-08-10 | Stop reason: ALTCHOICE

## 2018-08-07 RX ORDER — METHYLPREDNISOLONE 4 MG/1
TABLET ORAL
Refills: 0 | COMMUNITY
Start: 2018-06-18 | End: 2018-10-18

## 2018-08-07 RX ORDER — PHENTERMINE HYDROCHLORIDE 37.5 MG/1
TABLET ORAL
COMMUNITY
Start: 2018-07-09 | End: 2018-10-08 | Stop reason: SDUPTHER

## 2018-08-07 RX ORDER — PHENTERMINE HYDROCHLORIDE 37.5 MG/1
TABLET ORAL
Refills: 1 | COMMUNITY
Start: 2018-07-09 | End: 2020-02-04

## 2018-08-07 RX ORDER — TOPIRAMATE 100 MG/1
TABLET, FILM COATED ORAL
COMMUNITY
Start: 2018-06-27

## 2018-08-07 RX ORDER — DOXYCYCLINE HYCLATE 100 MG
100 TABLET ORAL 2 TIMES DAILY WITH MEALS
Refills: 1 | COMMUNITY
Start: 2018-07-17 | End: 2018-10-18

## 2018-08-07 RX ORDER — LIDOCAINE AND PRILOCAINE 25; 25 MG/G; MG/G
CREAM TOPICAL
COMMUNITY
Start: 2018-07-30 | End: 2018-10-18

## 2018-08-07 NOTE — PROGRESS NOTES
Assessment/Plan:    No problem-specific Assessment & Plan notes found for this encounter  Diagnoses and all orders for this visit:    Burning with urination  -     POCT urine dip  -     Urine culture    Vaginal itching  -     VAGINOSIS DNA PROBE (AFFIRM)  -     Chlamydia/GC amplified DNA by PCR    Vaginal discharge  -     VAGINOSIS DNA PROBE (AFFIRM)  -     Chlamydia/GC amplified DNA by PCR    Other orders  -     topiramate (TOPAMAX) 100 mg tablet; TAKE 1 TABLET BY MOUTH TWICE A DAY  -     phentermine (ADIPEX-P) 37 5 MG tablet; START WITH 1/2 TAB AFTER LUNCH  INCREASE TO ONE TAB BY MOUTH AS DIRECTED  -     phentermine (ADIPEX-P) 37 5 MG tablet; Start with 1/2 tab after lunch  Increase to one tab PO as directed  -     Methylprednisolone 4 MG TBPK; Take as directed  -     lidocaine-prilocaine (EMLA) cream;   -     doxycycline hyclate (VIBRA-TABS) 100 mg tablet; Take 100 mg by mouth 2 (two) times a day with meals  -     azithromycin (ZITHROMAX) 250 mg tablet;   -     amoxicillin (AMOXIL) 875 mg tablet; Take 875 mg by mouth        Patient signed form  All info will be faxed to [facility] this week  Urine dip positive for protein  Sample sent for culture  Vaginal cultures done  We will call with results and treat as necessary  If cultures are negative, will order pelvic U/S to investigate cramping and tenderness on exam     Orders were entered for Pap in May, but sample was never collected due to patient's insurance  Medicare only pays for a Pap every other year  Patient states she received a bill from [facility] for the Pap, but she can not find the electronic invoice today  She will print it out, then call  Can collect Pap at time of results visit in 4 weeks  Subjective:      Patient ID: Osvaldo Dewitt is a 38 y o  female  Patient is here with multiple vaginal complaints  States she has been experiencing discharge, odor, itching, and burning with urination for a few weeks    Also having cramping and pelvic pain  Has also noticed that her urine "seems much darker" than usual   She denies any further urinary symptoms, abnormal bleeding, abdominal pain, n/v, and fever/chills  Has had intercourse with an on again off again partner over the last 6 weeks  Needs to sign a prior authorization request today so her genetic testing can be approved  Would like results of her Pap from May  The following portions of the patient's history were reviewed and updated as appropriate: allergies, current medications, past family history, past medical history, past social history, past surgical history and problem list     Review of Systems   Constitutional: Negative for appetite change, chills and fever  Gastrointestinal: Negative for abdominal distention, abdominal pain, constipation, diarrhea, nausea and vomiting  Genitourinary: Positive for dysuria (Burning on urination), pelvic pain and vaginal discharge (With itching and odor)  Negative for difficulty urinating, frequency, hematuria, menstrual problem, urgency, vaginal bleeding and vaginal pain  Objective:      /74   Wt 94 1 kg (207 lb 6 4 oz)   BMI 31 08 kg/m²          Physical Exam   Constitutional: She is oriented to person, place, and time  Vital signs are normal  She appears well-developed and well-nourished  Genitourinary: Vagina normal  No labial fusion  There is no rash, tenderness, lesion or injury on the right labia  There is no rash, tenderness, lesion or injury on the left labia  Uterus is tender  Uterus is not enlarged  Cervix exhibits no motion tenderness, no discharge and no friability  Right adnexum displays tenderness  Right adnexum displays no mass and no fullness  Left adnexum displays tenderness  Left adnexum displays no mass and no fullness  No erythema, tenderness or bleeding in the vagina  No vaginal discharge found  Lymphadenopathy:        Right: No inguinal adenopathy present          Left: No inguinal adenopathy present  Neurological: She is alert and oriented to person, place, and time  Skin: Skin is warm and dry  Psychiatric: She has a normal mood and affect  Her behavior is normal  Judgment and thought content normal    Vitals reviewed

## 2018-08-07 NOTE — PATIENT INSTRUCTIONS
F/u for results of genetic testing in 4 weeks  Can collect Pap at that time if patient wishes  We will call with culture results and treat as necessary

## 2018-08-08 LAB
BACTERIA UR CULT: NORMAL
CANDIDA RRNA VAG QL PROBE: NEGATIVE
CHLAMYDIA DNA CVX QL NAA+PROBE: NORMAL
G VAGINALIS RRNA GENITAL QL PROBE: NEGATIVE
N GONORRHOEA DNA GENITAL QL NAA+PROBE: NORMAL
T VAGINALIS RRNA GENITAL QL PROBE: NEGATIVE

## 2018-08-10 ENCOUNTER — TELEPHONE (OUTPATIENT)
Dept: OBGYN CLINIC | Facility: CLINIC | Age: 43
End: 2018-08-10

## 2018-08-10 DIAGNOSIS — N39.0 URINARY TRACT INFECTION WITHOUT HEMATURIA, SITE UNSPECIFIED: Primary | ICD-10-CM

## 2018-08-10 RX ORDER — AMPICILLIN 500 MG/1
500 CAPSULE ORAL 2 TIMES DAILY
Qty: 14 CAPSULE | Refills: 0 | Status: SHIPPED | OUTPATIENT
Start: 2018-08-10 | End: 2018-08-17

## 2018-08-10 NOTE — TELEPHONE ENCOUNTER
Spoke with patient regarding culture results  Vaginal cultures negative  Urine culture mildly positive for gamma hemolytic strep  She is still having symptoms  Rx for ampicillin 500 mg BID x 7 days sent to pharmacy  Patient states that although she has an allergy to cephalosporins, she is able to take amoxicillin  Call with any problems  Prior authorization forms for genetic testing faxed to [facility]

## 2018-08-21 ENCOUNTER — TELEPHONE (OUTPATIENT)
Dept: OBGYN CLINIC | Facility: CLINIC | Age: 43
End: 2018-08-21

## 2018-08-21 DIAGNOSIS — N83.209 CYST OF OVARY, UNSPECIFIED LATERALITY: Primary | ICD-10-CM
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
## 2018-08-27 ENCOUNTER — TELEPHONE (OUTPATIENT)
Dept: OBGYN CLINIC | Facility: CLINIC | Age: 43
End: 2018-08-27

## 2018-08-29 NOTE — TELEPHONE ENCOUNTER
Patient received a bill from [facility] for yearly gyn exam including Pap smear that was not collected  Emilee Hudson will contact billing to have codes corrected

## 2018-09-06 ENCOUNTER — HOSPITAL ENCOUNTER (OUTPATIENT)
Dept: ULTRASOUND IMAGING | Facility: MEDICAL CENTER | Age: 43
Discharge: HOME | End: 2018-09-06
Payer: MEDICARE

## 2018-09-06 DIAGNOSIS — N83.209 CYST OF OVARY, UNSPECIFIED LATERALITY: ICD-10-CM

## 2018-09-06 PROCEDURE — 76830 TRANSVAGINAL US NON-OB: CPT

## 2018-09-06 PROCEDURE — 76856 US EXAM PELVIC COMPLETE: CPT

## 2018-10-04 ENCOUNTER — TELEPHONE (OUTPATIENT)
Dept: OBGYN CLINIC | Facility: CLINIC | Age: 43
End: 2018-10-04

## 2018-10-04 DIAGNOSIS — N83.202 CYST OF LEFT OVARY: Primary | ICD-10-CM

## 2018-10-04 NOTE — TELEPHONE ENCOUNTER
Spoke with patient regarding genetic testing and pelvic U/S results  Medicaid will cover genetic testing; patient just needs to sign form and send it back to [facility]  Pelvic U/S showed 1 8 cm L ovarian corpus luteum  Patient states she has had chantale Ivey this month  Will repeat U/S in another 4-6 weeks  Order entered  Has f/u appt for genetic testing results on 10/31

## 2018-10-08 ENCOUNTER — OFFICE VISIT (OUTPATIENT)
Dept: SURGERY | Facility: CLINIC | Age: 43
End: 2018-10-08
Payer: MEDICARE

## 2018-10-08 VITALS
SYSTOLIC BLOOD PRESSURE: 122 MMHG | RESPIRATION RATE: 18 BRPM | HEIGHT: 69 IN | HEART RATE: 109 BPM | WEIGHT: 212 LBS | BODY MASS INDEX: 31.4 KG/M2 | TEMPERATURE: 98.5 F | DIASTOLIC BLOOD PRESSURE: 88 MMHG

## 2018-10-08 DIAGNOSIS — L05.91 PILONIDAL CYST: Primary | ICD-10-CM

## 2018-10-08 PROCEDURE — 99213 OFFICE O/P EST LOW 20 MIN: CPT | Performed by: SURGERY

## 2018-10-08 RX ORDER — METHOCARBAMOL 750 MG/1
750 TABLET, FILM COATED ORAL
COMMUNITY
Start: 2018-08-15 | End: 2018-10-18

## 2018-10-08 RX ORDER — ACETAMINOPHEN 500 MG
500 TABLET ORAL EVERY 4 HOURS PRN
COMMUNITY
Start: 2018-08-15 | End: 2019-10-23 | Stop reason: ALTCHOICE

## 2018-10-08 RX ORDER — PHENTERMINE HYDROCHLORIDE 37.5 MG/1
TABLET ORAL
COMMUNITY
Start: 2018-10-01 | End: 2018-10-08 | Stop reason: SDUPTHER

## 2018-10-08 RX ORDER — AMOXICILLIN 250 MG
1 CAPSULE ORAL
COMMUNITY
Start: 2018-08-15 | End: 2018-10-18

## 2018-10-08 RX ORDER — POLYETHYLENE GLYCOL 1450
17 POWDER (GRAM) MISCELLANEOUS EVERY 24 HOURS
COMMUNITY
Start: 2018-08-15 | End: 2018-10-18
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
## 2018-10-10 PROBLEM — L05.91 PILONIDAL CYST: Status: ACTIVE | Noted: 2018-10-10

## 2018-10-18 NOTE — PRE-PROCEDURE INSTRUCTIONS
Pre-Surgery Instructions:   Medication Instructions    acetaminophen (TYLENOL) 500 mg tablet Instructed patient per Anesthesia Guidelines   Amoxicillin-Pot Clavulanate (AUGMENTIN PO) Instructed patient per Anesthesia Guidelines   aspirin-acetaminophen-caffeine (EXCEDRIN MIGRAINE) 544-820-39 MG per tablet Instructed patient per Anesthesia Guidelines   buPROPion (WELLBUTRIN XL) 300 mg 24 hr tablet Instructed patient per Anesthesia Guidelines   clindamycin (CLEOCIN) 300 MG capsule Instructed patient per Anesthesia Guidelines   clindamycin (CLINDAGEL) 1 % gel Instructed patient per Anesthesia Guidelines   cyclobenzaprine (FLEXERIL) 10 mg tablet Instructed patient per Anesthesia Guidelines   Diclofenac Potassium (CAMBIA) 50 MG PACK Instructed patient per Anesthesia Guidelines   estradiol (ESTRACE VAGINAL) 0 1 mg/g vaginal cream Instructed patient per Anesthesia Guidelines   fluticasone (FLONASE) 50 mcg/act nasal spray Instructed patient per Anesthesia Guidelines   HYDROmorphone (DILAUDID) 2 mg tablet Instructed patient per Anesthesia Guidelines   loratadine (CLARITIN) 10 mg tablet Instructed patient per Anesthesia Guidelines   metFORMIN (GLUCOPHAGE-XR) 500 mg 24 hr tablet Instructed patient per Anesthesia Guidelines   Multiple Vitamins-Iron (QC DAILY MULTIVITAMINS/IRON) TABS Instructed patient per Anesthesia Guidelines   phentermine (ADIPEX-P) 37 5 MG tablet Instructed patient per Anesthesia Guidelines   RABEprazole (ACIPHEX) 20 MG tablet Instructed patient per Anesthesia Guidelines   topiramate (TOPAMAX) 100 mg tablet Instructed patient per Anesthesia Guidelines   [DISCONTINUED] Methylprednisolone 4 MG TBPK Instructed patient per Anesthesia Guidelines  Per anesthesia patient was told to stop NSAIDS and supplements one week preop and on DOS with sip of water may take Wellbutrin  Instructed on use of Chlorhexidine for preop bathing

## 2018-10-22 ENCOUNTER — ANESTHESIA EVENT (OUTPATIENT)
Dept: PERIOP | Facility: HOSPITAL | Age: 43
End: 2018-10-22
Payer: MEDICARE

## 2018-10-31 ENCOUNTER — OFFICE VISIT (OUTPATIENT)
Dept: OBGYN CLINIC | Facility: CLINIC | Age: 43
End: 2018-10-31
Payer: MEDICARE

## 2018-10-31 VITALS — WEIGHT: 209.4 LBS | BODY MASS INDEX: 31.38 KG/M2 | DIASTOLIC BLOOD PRESSURE: 74 MMHG | SYSTOLIC BLOOD PRESSURE: 118 MMHG

## 2018-10-31 DIAGNOSIS — Z71.2 ENCOUNTER TO DISCUSS TEST RESULTS: ICD-10-CM

## 2018-10-31 DIAGNOSIS — Z13.79 GENETIC TESTING: Primary | ICD-10-CM

## 2018-10-31 PROCEDURE — 99213 OFFICE O/P EST LOW 20 MIN: CPT | Performed by: PHYSICIAN ASSISTANT

## 2018-10-31 RX ORDER — HYDROXYZINE PAMOATE 25 MG/1
25 CAPSULE ORAL
Refills: 0 | COMMUNITY
Start: 2018-09-04 | End: 2020-02-04

## 2018-10-31 RX ORDER — PREDNISONE 10 MG/1
TABLET ORAL
Refills: 0 | COMMUNITY
Start: 2018-10-10 | End: 2019-10-23 | Stop reason: ALTCHOICE

## 2018-10-31 NOTE — PROGRESS NOTES
Assessment/Plan:    No problem-specific Assessment & Plan notes found for this encounter  Diagnoses and all orders for this visit:    Genetic testing    Encounter to discuss test results    Other orders  -     predniSONE 10 mg tablet; TAKE 4 TABLETS DAILY FOR 2 DAYS, THEN 3 DAILY FOR 2 DAYS, THEN 2 DAILY FOR 2 DAYS, 1 DAILY FOR 2 DAY  -     hydrOXYzine pamoate (VISTARIL) 25 mg capsule; Take 25 mg by mouth daily at bedtime as needed        Recommended patient to call surgeon's office regarding possible UTI symptoms and upcoming surgery  Genetic testing results negative - no clinically significant mutations identified  No variants of uncertain significance identified  [employer] lifetime breast cancer riskScore calculated at 15 7%, with a 5 year risk of 1 2%  This is slightly higher than the lifetime risk for women in the patient's age group in the general population of 12 5%  [facility] lifetime breast cancer risk calculated at 11 1%, with a 5 year risk of 0 8%  Explained that the difference is because [employer] also analyzes sections of the patient's DNA, which the [facility] does not  Both calculations under the 20% threshold for screening management changes  Patient to continue routine breast cancer screenings  Counseled patient that a negative result does not mean that she will never get cancer, only that she is not at increased risk for certain types of cancer due to mutations on the genes analyzed  Also discussed that just because patient is negative, it does not mean that other family members are negative  They should still speak with their healthcare providers regarding appropriateness of testing  Referred patient to [facility] number if she has further questions regarding the genes analyzed, or the testing done  Answered all patient's questions  She received a printed copy of her results; we will mail the results folder when it arrives at the office    Patient will schedule annual exam, as well as f/u U/S for ovarian cyst today  Time of visit 15 minutes; >50% spent counseling  Subjective:      Patient ID: Belle Lam is a 38 y o  female  Patient is here for genetic testing results  Having surgery tomorrow for pilonidal cyst removal   Is complaining of bladder pressure and urinary frequency/urgency  Knows she will get IV antibiotics with surgery tomorrow  Patient had L ovarian cyst on U/S on 10/4  She is due for f/u in the next few weeks  The following portions of the patient's history were reviewed and updated as appropriate: allergies, current medications, past family history, past medical history, past social history, past surgical history and problem list     Review of Systems   Genitourinary: Positive for dysuria, frequency, pelvic pain and urgency  Negative for difficulty urinating  Objective:      /74   Wt 95 kg (209 lb 6 4 oz)   LMP 10/17/2018   BMI 31 38 kg/m²          Physical Exam   Constitutional: She is oriented to person, place, and time  Vital signs are normal  She appears well-developed and well-nourished  Neurological: She is alert and oriented to person, place, and time  Psychiatric: She has a normal mood and affect  Her behavior is normal  Judgment and thought content normal    Vitals reviewed

## 2018-11-01 ENCOUNTER — ANESTHESIA (OUTPATIENT)
Dept: PERIOP | Facility: HOSPITAL | Age: 43
End: 2018-11-01
Payer: MEDICARE

## 2018-11-01 ENCOUNTER — HOSPITAL ENCOUNTER (OUTPATIENT)
Facility: HOSPITAL | Age: 43
Setting detail: OUTPATIENT SURGERY
Discharge: HOME | End: 2018-11-01
Attending: SURGERY | Admitting: SURGERY
Payer: MEDICARE

## 2018-11-01 VITALS
DIASTOLIC BLOOD PRESSURE: 67 MMHG | WEIGHT: 209 LBS | RESPIRATION RATE: 16 BRPM | HEART RATE: 98 BPM | BODY MASS INDEX: 31.67 KG/M2 | TEMPERATURE: 97 F | OXYGEN SATURATION: 99 % | HEIGHT: 68 IN | SYSTOLIC BLOOD PRESSURE: 112 MMHG

## 2018-11-01 DIAGNOSIS — L05.91 PILONIDAL CYST: Primary | ICD-10-CM

## 2018-11-01 LAB
EXT PREGNANCY TEST URINE: NEGATIVE
GLUCOSE SERPL-MCNC: 84 MG/DL (ref 65–140)

## 2018-11-01 PROCEDURE — 82948 REAGENT STRIP/BLOOD GLUCOSE: CPT

## 2018-11-01 PROCEDURE — 88304 TISSUE EXAM BY PATHOLOGIST: CPT | Performed by: PATHOLOGY

## 2018-11-01 PROCEDURE — 81025 URINE PREGNANCY TEST: CPT | Performed by: ANESTHESIOLOGY

## 2018-11-01 PROCEDURE — 11770 EXC PILONIDAL CYST SIMPLE: CPT | Performed by: SURGERY

## 2018-11-01 RX ORDER — HYDROCODONE BITARTRATE AND ACETAMINOPHEN 5; 325 MG/1; MG/1
1 TABLET ORAL EVERY 4 HOURS PRN
Status: DISCONTINUED | OUTPATIENT
Start: 2018-11-01 | End: 2018-11-01 | Stop reason: HOSPADM

## 2018-11-01 RX ORDER — DEXTROSE AND SODIUM CHLORIDE 5; .45 G/100ML; G/100ML
80 INJECTION, SOLUTION INTRAVENOUS CONTINUOUS
Status: DISCONTINUED | OUTPATIENT
Start: 2018-11-01 | End: 2018-11-01 | Stop reason: HOSPADM

## 2018-11-01 RX ORDER — ONDANSETRON 2 MG/ML
4 INJECTION INTRAMUSCULAR; INTRAVENOUS ONCE AS NEEDED
Status: DISCONTINUED | OUTPATIENT
Start: 2018-11-01 | End: 2018-11-01 | Stop reason: HOSPADM

## 2018-11-01 RX ORDER — PROPOFOL 10 MG/ML
INJECTION, EMULSION INTRAVENOUS CONTINUOUS PRN
Status: DISCONTINUED | OUTPATIENT
Start: 2018-11-01 | End: 2018-11-01 | Stop reason: SURG

## 2018-11-01 RX ORDER — MIDAZOLAM HYDROCHLORIDE 1 MG/ML
INJECTION INTRAMUSCULAR; INTRAVENOUS AS NEEDED
Status: DISCONTINUED | OUTPATIENT
Start: 2018-11-01 | End: 2018-11-01 | Stop reason: SURG

## 2018-11-01 RX ORDER — ONDANSETRON 4 MG/1
4 TABLET, ORALLY DISINTEGRATING ORAL EVERY 4 HOURS PRN
Status: DISCONTINUED | OUTPATIENT
Start: 2018-11-01 | End: 2018-11-01 | Stop reason: HOSPADM

## 2018-11-01 RX ORDER — HYDROMORPHONE HCL/PF 1 MG/ML
1 SYRINGE (ML) INJECTION
Status: DISCONTINUED | OUTPATIENT
Start: 2018-11-01 | End: 2018-11-01 | Stop reason: HOSPADM

## 2018-11-01 RX ORDER — FENTANYL CITRATE 50 UG/ML
INJECTION, SOLUTION INTRAMUSCULAR; INTRAVENOUS AS NEEDED
Status: DISCONTINUED | OUTPATIENT
Start: 2018-11-01 | End: 2018-11-01 | Stop reason: SURG

## 2018-11-01 RX ORDER — ACETAMINOPHEN 325 MG/1
650 TABLET ORAL EVERY 6 HOURS PRN
Status: DISCONTINUED | OUTPATIENT
Start: 2018-11-01 | End: 2018-11-01 | Stop reason: HOSPADM

## 2018-11-01 RX ORDER — DEXMEDETOMIDINE HYDROCHLORIDE 100 UG/ML
INJECTION, SOLUTION INTRAVENOUS AS NEEDED
Status: DISCONTINUED | OUTPATIENT
Start: 2018-11-01 | End: 2018-11-01 | Stop reason: SURG

## 2018-11-01 RX ORDER — ONDANSETRON 2 MG/ML
INJECTION INTRAMUSCULAR; INTRAVENOUS AS NEEDED
Status: DISCONTINUED | OUTPATIENT
Start: 2018-11-01 | End: 2018-11-01 | Stop reason: SURG

## 2018-11-01 RX ORDER — SODIUM CHLORIDE 9 MG/ML
125 INJECTION, SOLUTION INTRAVENOUS CONTINUOUS
Status: DISCONTINUED | OUTPATIENT
Start: 2018-11-01 | End: 2018-11-01 | Stop reason: HOSPADM

## 2018-11-01 RX ORDER — ONDANSETRON 2 MG/ML
4 INJECTION INTRAMUSCULAR; INTRAVENOUS EVERY 4 HOURS PRN
Status: DISCONTINUED | OUTPATIENT
Start: 2018-11-01 | End: 2018-11-01 | Stop reason: HOSPADM

## 2018-11-01 RX ORDER — DEXAMETHASONE SODIUM PHOSPHATE 4 MG/ML
INJECTION, SOLUTION INTRA-ARTICULAR; INTRALESIONAL; INTRAMUSCULAR; INTRAVENOUS; SOFT TISSUE AS NEEDED
Status: DISCONTINUED | OUTPATIENT
Start: 2018-11-01 | End: 2018-11-01 | Stop reason: SURG

## 2018-11-01 RX ORDER — KETAMINE HYDROCHLORIDE 50 MG/ML
INJECTION, SOLUTION, CONCENTRATE INTRAMUSCULAR; INTRAVENOUS AS NEEDED
Status: DISCONTINUED | OUTPATIENT
Start: 2018-11-01 | End: 2018-11-01 | Stop reason: SURG

## 2018-11-01 RX ORDER — MORPHINE SULFATE 10 MG/ML
2 INJECTION, SOLUTION INTRAMUSCULAR; INTRAVENOUS EVERY 2 HOUR PRN
Status: DISCONTINUED | OUTPATIENT
Start: 2018-11-01 | End: 2018-11-01 | Stop reason: HOSPADM

## 2018-11-01 RX ORDER — SCOLOPAMINE TRANSDERMAL SYSTEM 1 MG/1
1 PATCH, EXTENDED RELEASE TRANSDERMAL ONCE AS NEEDED
Status: DISCONTINUED | OUTPATIENT
Start: 2018-11-01 | End: 2018-11-01 | Stop reason: HOSPADM

## 2018-11-01 RX ORDER — FENTANYL CITRATE/PF 50 MCG/ML
25 SYRINGE (ML) INJECTION
Status: DISCONTINUED | OUTPATIENT
Start: 2018-11-01 | End: 2018-11-01 | Stop reason: HOSPADM

## 2018-11-01 RX ORDER — CLINDAMYCIN PHOSPHATE 900 MG/50ML
900 INJECTION INTRAVENOUS ONCE
Status: COMPLETED | OUTPATIENT
Start: 2018-11-01 | End: 2018-11-01

## 2018-11-01 RX ORDER — HYDROCODONE BITARTRATE AND ACETAMINOPHEN 5; 325 MG/1; MG/1
1-2 TABLET ORAL EVERY 4 HOURS PRN
Qty: 30 TABLET | Refills: 0 | Status: SHIPPED | OUTPATIENT
Start: 2018-11-01 | End: 2019-10-23 | Stop reason: ALTCHOICE

## 2018-11-01 RX ADMIN — FENTANYL CITRATE 25 MCG: 50 INJECTION INTRAMUSCULAR; INTRAVENOUS at 13:09

## 2018-11-01 RX ADMIN — FENTANYL CITRATE 25 MCG: 50 INJECTION INTRAMUSCULAR; INTRAVENOUS at 12:36

## 2018-11-01 RX ADMIN — ONDANSETRON HYDROCHLORIDE 4 MG: 2 INJECTION, SOLUTION INTRAVENOUS at 11:40

## 2018-11-01 RX ADMIN — CLINDAMYCIN PHOSPHATE 900 MG: 18 INJECTION, SOLUTION INTRAMUSCULAR; INTRAVENOUS at 11:35

## 2018-11-01 RX ADMIN — DEXMEDETOMIDINE HYDROCHLORIDE 4 MCG: 100 INJECTION, SOLUTION INTRAVENOUS at 11:32

## 2018-11-01 RX ADMIN — ONDANSETRON 4 MG: 2 INJECTION INTRAMUSCULAR; INTRAVENOUS at 13:58

## 2018-11-01 RX ADMIN — KETAMINE HYDROCHLORIDE 25 MG: 50 INJECTION INTRAMUSCULAR; INTRAVENOUS at 11:32

## 2018-11-01 RX ADMIN — ACETAMINOPHEN 650 MG: 325 TABLET, FILM COATED ORAL at 14:11

## 2018-11-01 RX ADMIN — SCOPALAMINE 1 PATCH: 1 PATCH, EXTENDED RELEASE TRANSDERMAL at 10:27

## 2018-11-01 RX ADMIN — MIDAZOLAM 2 MG: 1 INJECTION INTRAMUSCULAR; INTRAVENOUS at 11:41

## 2018-11-01 RX ADMIN — FENTANYL CITRATE 25 MCG: 50 INJECTION INTRAMUSCULAR; INTRAVENOUS at 12:46

## 2018-11-01 RX ADMIN — DEXAMETHASONE SODIUM PHOSPHATE 4 MG: 4 INJECTION, SOLUTION INTRAMUSCULAR; INTRAVENOUS at 11:40

## 2018-11-01 RX ADMIN — SODIUM CHLORIDE 125 ML/HR: 0.9 INJECTION, SOLUTION INTRAVENOUS at 12:30

## 2018-11-01 RX ADMIN — MIDAZOLAM 2 MG: 1 INJECTION INTRAMUSCULAR; INTRAVENOUS at 11:32

## 2018-11-01 RX ADMIN — PROPOFOL 100 MCG/KG/MIN: 10 INJECTION, EMULSION INTRAVENOUS at 11:32

## 2018-11-01 RX ADMIN — Medication 0.4 MCG/KG/HR: at 11:32

## 2018-11-01 RX ADMIN — FENTANYL CITRATE 25 MCG: 50 INJECTION, SOLUTION INTRAMUSCULAR; INTRAVENOUS at 11:45

## 2018-11-01 RX ADMIN — MIDAZOLAM 2 MG: 1 INJECTION INTRAMUSCULAR; INTRAVENOUS at 11:26

## 2018-11-01 RX ADMIN — KETAMINE HYDROCHLORIDE 25 MG: 50 INJECTION INTRAMUSCULAR; INTRAVENOUS at 11:43

## 2018-11-01 RX ADMIN — FENTANYL CITRATE 25 MCG: 50 INJECTION, SOLUTION INTRAMUSCULAR; INTRAVENOUS at 11:41

## 2018-11-01 RX ADMIN — SODIUM CHLORIDE 125 ML/HR: 0.9 INJECTION, SOLUTION INTRAVENOUS at 10:12
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
## 2018-11-01 NOTE — OP NOTE
EXCISION PILONIDAL CYST  Postoperative Note  PATIENT NAME: Joann Ponce  : 1975  MRN: [id number]  AL OR ROOM 08    Surgery Date: 2018    Pilonidal cyst [L05 ]    Operative Indications:  Pilonidal Cyst - chronically inflammed      Consent:  The risks, benefits, and alternatives to the surgery were discussed with the patient and with the family prior to surgery, personally by Dr Marci Boone  If the consent was obtained by the physician assistant or other representative, the consent was reviewed once again personally by the operating physician  Common complications particular for this procedure as well as unusual complications were discussed, including but not limited to:  bleeding, wound infection, prolonged wound healing, open wounds, reoperation, leak from the bowel or viscus, leak from the bile duct or injury to adjacent or other organs or blood vessels in the abdomen  The likelihood of reoperation,  open wounds and prolonged wound healing was discussed at length and this is considered a high risk for this type of procedure and a high probability of occurrence  A  was used if necessary  The patient expressed understanding of the issues discussed and wished and consented to the procedure to proceed  All questions were answered  Dr Marci Boone personally discussed the informed consent with this patient  Operative Findings:  Areas relatively clean of infection  All affected tissue removed  Wound closed  Post-Op Diagnosis Codes:     * Pilonidal cyst [L05 91]    Procedure(s):  EXCISION PILONIDAL CYST    Surgeon(s) and Role:     * Anderson Mathew MD - Primary     * Giovanni Patient, ANGIE - Assisting    The Physician Assistant was medically necessary for surgical safety the case including suturing, retraction, and hemostasis  No qualified resident was available  I was present for the entire procedure       Drains:       Specimens:  ID Type Source Tests Collected by Time Destination 1 : PILONIDAL TISSUE Tissue Pilonidal Cyst/Sinus TISSUE EXAM Spenser Grimm MD 2018 1143        Estimated Blood Loss:   Minimal    Anesthesia Type:   Choice     Procedure: The patient is brought to the operating room and identified  Location of the procedure site was confirmed and marked with the patient  Staff confirmed patient name, , procedure, and site  The patient underwent general anesthesia under the direction of the anesthesia department and was placed in the jackknife position  Area was shaved, prepped and draped in a sterile fashion  A time-out was performed  Local anesthesia was used  An elliptical incision was made around the old cyst  Scissors, knife and cautery were used to excise the cyst  Ultimately, it measured 3 x 2 cm cm with 1 cm margins  The wound was irrigated  Hemostasis was assured  The deep layers were closed using 2-0 and 3-0 Vicryl suture and a 4-0 Monocryl subcuticular stitch  Buttress sutures of 3 0 nylon were placed x3  The wound was Histoacryl between those sutures  The wound was dressed    The patient tolerated the procedure well and was taken to recovery  Some portions of this record may have been generated with voice recognition software  There may be translation, syntax,  or grammatical errors  Occasional wrong word or "sound-a-like" substitutions may have occurred due to the inherent limitations of the voice recognition software  Read the chart carefully and recognize, using context, where substations may have occurred  If you have any questions, please contact the dictating provider for clarification or correction, as needed       Complications: None    Condition: Stable to PACU    SIGNATURE: Spenser Grimm MD   DATE: 2018   TIME: 11:55 AM

## 2018-11-01 NOTE — ANESTHESIA PREPROCEDURE EVALUATION
Review of Systems/Medical History  Patient summary reviewed  Chart reviewed  History of anesthetic complications (elevated LFT's after GA  Some prolonged emergences) PONV    Cardiovascular   Pulmonary  Shortness of breath,        GI/Hepatic    GERD ,        Negative  ROS        Endo/Other    Obesity    GYN  Negative gynecology ROS          Hematology  Negative hematology ROS      Musculoskeletal    Arthritis     Neurology    Headaches,    Psychology   Anxiety,              Physical Exam    Airway    Mallampati score: II  TM Distance: >3 FB  Neck ROM: full     Dental   No notable dental hx     Cardiovascular  Rhythm: regular, Rate: normal, Cardiovascular exam normal    Pulmonary  Pulmonary exam normal Breath sounds clear to auscultation,     Other Findings        Anesthesia Plan  ASA Score- 2     Anesthesia Type- general with ASA Monitors  Additional Monitors:   Airway Plan:     Comment: SCOP patch ordered  Refuses spinal as option  Plan Factors-Patient not instructed to abstain from smoking on day of procedure  Patient did not smoke on day of surgery  Induction- intravenous  Postoperative Plan- Plan for postoperative opioid use  Informed Consent- Anesthetic plan and risks discussed with patient

## 2018-11-01 NOTE — ANESTHESIA POSTPROCEDURE EVALUATION
Post-Op Assessment Note      CV Status:  Stable    Mental Status:  Alert and awake    Hydration Status:  Euvolemic    PONV Controlled:  Controlled    Airway Patency:  Patent    Post Op Vitals Reviewed: Yes          Staff: Anesthesiologist           /67 (11/01/18 1322)    Temp (!) 97 °F (36 1 °C) (11/01/18 1322)    Pulse 88 (11/01/18 1322)   Resp 16 (11/01/18 1322)    SpO2 100 % (11/01/18 1322)

## 2018-11-01 NOTE — DISCHARGE SUMMARY
Discharge Summary - [facility] 38 y o  female MRN: [id number]    Unit/Bed#: OR POOL Encounter: [id number]      Pre-Operative Diagnosis: Pre-Op Diagnosis Codes:     * Pilonidal cyst [L05 91]    Post-Operative Diagnosis: Post-Op Diagnosis Codes:     * Pilonidal cyst [L05 91]    Procedures Performed:  Procedure(s):  EXCISION PILONIDAL CYST    Surgeon: Stephanie Marina MD    See H & P for full details of admission and Operative Note for full details of operations performed  Patient was seen and examined prior to discharge  Provisions for Follow-Up Care:  See After Visit Summary for information related to follow-up care and home orders  Disposition: Home, in stable condition  Planned Readmission: No    Discharge Medications:  See after visit summary for reconciled discharge medications provided to patient and family  Post Operative instructions: Reviewed with patient and/or family  Some portions of this record may have been generated with voice recognition software  There may be translation, syntax,  or grammatical errors  Occasional wrong word or "sound-a-like" substitutions may have occurred due to the inherent limitations of the voice recognition software  Read the chart carefully and recognize, using context, where substitutions may have occurred  If you have any questions, please contact the dictating provider for clarification or correction, as needed  This encounter has been coded by non certified Coder      Signature:   Stephanie Marina MD  Date: 11/1/2018 Time: 11:56 AM

## 2018-11-02 ENCOUNTER — TELEPHONE (OUTPATIENT)
Dept: SURGERY | Facility: CLINIC | Age: 43
End: 2018-11-02

## 2018-11-02 ENCOUNTER — TELEPHONE (OUTPATIENT)
Dept: OTHER | Facility: HOSPITAL | Age: 43
End: 2018-11-02

## 2018-11-02 DIAGNOSIS — L05.91 PILONIDAL CYST: Primary | ICD-10-CM

## 2018-11-02 RX ORDER — HYDROMORPHONE HYDROCHLORIDE 2 MG/1
2 TABLET ORAL EVERY 6 HOURS PRN
Qty: 30 TABLET | Refills: 0 | Status: SHIPPED | OUTPATIENT
Start: 2018-11-02

## 2018-11-02 RX ORDER — HYDROMORPHONE HYDROCHLORIDE 2 MG/1
2 TABLET ORAL EVERY 6 HOURS PRN
Status: CANCELLED | OUTPATIENT
Start: 2018-11-02 | End: 2018-11-05

## 2018-11-02 NOTE — TELEPHONE ENCOUNTER
Call patient for follow-up, it left a message  Asked her to let us know before closing today whether she received her prescription    Signature:   Francesco Cristobal MD  Date: 11/2/2018 Time: 12:42 PM
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
## 2018-11-02 NOTE — TELEPHONE ENCOUNTER
Called and spoke to patient  Post-op Excision pilonidal cyst on 11/1/18  Patient denies any N/V/F/C, Eating and drinking ok  No BM yet, instructed her to take stool softeners daily and to keep her mind on that, and to contact office with any issues  She is aware that pain medication can make her constipated  She stated that she did not get pain medication filled at the hospital pharmacy because they told her  "it is similar to something she is allergic to"  Patient then stated she would get pain medication from her family doctor because he knows what she normally uses  Patient did mention her throat was very sore and that she had a long red bruise  I told patient I would talk with Sandrita Castillo PA-C if I could put her on hold for a few minutes  Patient agreed    Spoke to Sandrita Castillo PA-C who stated she did not have general anesthesia and that it may be the start of a sore throat  She should follow up with family doctor  Told patient what [facility] advised to do  Also told her she could follow up with and Urgent Care if family doctor was not available  Instructed patient to keep wound dry and clean  Patient is aware she will receive a call once pathology is finalized  Patient has post-op ov on 11/13  Ended call, and relayed information to [employer] pending

## 2018-11-08 NOTE — TELEPHONE ENCOUNTER
Final Diagnosis   A  Skin and soft tissue, pilonidal tissue, excision:  -  Portion of benign skin and underlying fibroadipose dermis and subcutaneous tissue with focal surface puckering without overt sinus tract or cyst formation   -  Negative for significant acute or chronic inflammation, dysplasia or carcinoma  Per Akiko Vaughn (), patient was contacted yesterday 11/7  Pathology results were given and understood  States she is feeling well   Confirmed POPV for 11/13

## 2018-11-13 ENCOUNTER — OFFICE VISIT (OUTPATIENT)
Dept: SURGERY | Facility: CLINIC | Age: 43
End: 2018-11-13

## 2018-11-13 VITALS
HEIGHT: 68 IN | HEART RATE: 95 BPM | WEIGHT: 213 LBS | TEMPERATURE: 98.4 F | SYSTOLIC BLOOD PRESSURE: 126 MMHG | DIASTOLIC BLOOD PRESSURE: 82 MMHG | BODY MASS INDEX: 32.28 KG/M2

## 2018-11-13 DIAGNOSIS — L05.91 PILONIDAL CYST: Primary | ICD-10-CM

## 2018-11-13 PROCEDURE — 99024 POSTOP FOLLOW-UP VISIT: CPT | Performed by: PHYSICIAN ASSISTANT

## 2018-11-13 RX ORDER — POLYETHYLENE GLYCOL 3350 17 G/17G
17 POWDER, FOR SOLUTION ORAL
COMMUNITY
Start: 2018-08-15 | End: 2019-10-23 | Stop reason: ALTCHOICE

## 2018-11-13 NOTE — LETTER
November 13, 2018     Patient: Author Mcelroy   YOB: 1975   Date of Visit: 11/13/2018       To Whom it May Concern:    Yates Landau is under my professional care  She was seen in my office on 11/13/2018  She is recovering from recent surgery  Please excuse from school functions  If you have any questions or concerns, please don't hesitate to call           Sincerely,          Sharla Pope PA-C        CC: No Recipients
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
## 2018-11-13 NOTE — LETTER
November 13, 2018     Jenna Horton, [address] [facility] [address]    Patient: Valentin Trammell   YOB: 1975   Date of Visit: 11/13/2018       Dear Dr Iban Ca:    Thank you for referring Cira Demarco to me for evaluation  Below are my notes for this consultation  If you have questions, please do not hesitate to call me  I look forward to following your patient along with you           Sincerely,        Adrienne Majano PA-C        CC: No Recipients

## 2018-11-26 RX ORDER — AMOXICILLIN AND CLAVULANATE POTASSIUM 875; 125 MG/1; MG/1
TABLET, FILM COATED ORAL
Refills: 0 | COMMUNITY
Start: 2018-10-10 | End: 2019-10-23 | Stop reason: ALTCHOICE

## 2018-11-27 ENCOUNTER — OFFICE VISIT (OUTPATIENT)
Dept: SURGERY | Facility: CLINIC | Age: 43
End: 2018-11-27

## 2018-11-27 VITALS
SYSTOLIC BLOOD PRESSURE: 128 MMHG | TEMPERATURE: 97.8 F | RESPIRATION RATE: 18 BRPM | HEART RATE: 94 BPM | DIASTOLIC BLOOD PRESSURE: 84 MMHG | HEIGHT: 68 IN | BODY MASS INDEX: 32.28 KG/M2 | WEIGHT: 213 LBS

## 2018-11-27 DIAGNOSIS — L05.91 PILONIDAL CYST: Primary | ICD-10-CM

## 2018-11-27 DIAGNOSIS — B49 FUNGAL INFECTION: ICD-10-CM

## 2018-11-27 PROCEDURE — 99024 POSTOP FOLLOW-UP VISIT: CPT | Performed by: SURGERY

## 2018-11-27 RX ORDER — TOPIRAMATE 100 MG/1
TABLET, FILM COATED ORAL
COMMUNITY
Start: 2018-11-15 | End: 2019-10-23 | Stop reason: SDUPTHER

## 2018-11-27 RX ORDER — NYSTATIN 100000 U/G
OINTMENT TOPICAL 2 TIMES DAILY
Qty: 30 G | Refills: 0 | Status: SHIPPED | OUTPATIENT
Start: 2018-11-27 | End: 2019-10-23 | Stop reason: ALTCHOICE
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
## 2018-12-19 RX ORDER — BUPROPION HYDROCHLORIDE 150 MG/1
TABLET ORAL
Refills: 2 | COMMUNITY
Start: 2018-12-04 | End: 2019-10-23 | Stop reason: DRUGHIGH

## 2018-12-19 RX ORDER — METHOCARBAMOL 500 MG/1
TABLET, FILM COATED ORAL
Refills: 5 | COMMUNITY
Start: 2018-12-03 | End: 2019-10-23 | Stop reason: ALTCHOICE

## 2018-12-19 RX ORDER — METHYLPREDNISOLONE 4 MG/1
TABLET ORAL
Refills: 0 | COMMUNITY
Start: 2018-12-03 | End: 2019-10-23 | Stop reason: ALTCHOICE

## 2018-12-20 ENCOUNTER — ULTRASOUND (OUTPATIENT)
Dept: OBGYN CLINIC | Facility: CLINIC | Age: 43
End: 2018-12-20
Payer: MEDICARE

## 2018-12-20 DIAGNOSIS — R10.2 PELVIC PAIN IN FEMALE: ICD-10-CM

## 2018-12-20 DIAGNOSIS — N83.292 COMPLEX CYST OF LEFT OVARY: Primary | ICD-10-CM

## 2018-12-20 DIAGNOSIS — N93.9 ABNORMAL UTERINE BLEEDING (AUB): ICD-10-CM

## 2018-12-20 PROCEDURE — 76856 US EXAM PELVIC COMPLETE: CPT | Performed by: OBSTETRICS & GYNECOLOGY

## 2018-12-20 NOTE — PROGRESS NOTES
AMB US Pelvic Non OB  Date/Time: 12/20/2018 1:11 PM  Performed by: Jensen Cano  Authorized by: Kahty Ribera     Procedure details:     Indications: ovarian cysts, non-obstetric abdominal pain and intermenstrual blood loss      Technique:  US Pelvic, Non-OB with complete exam  Uterine findings:     Diameter (mm):  102    Length (mm):  58    Width (mm):  57    Uterine adhesions: not identified      Adnexal mass: not identified      Polyps: not identified      Myomas: identified      Endometrial stripe: identified      Endometrial hyperplasia: not identified      Endometrium thickness (mm):  10  Left ovary findings:     Left ovary:  Visualized    Diameter (mm):  31    Length (mm):  33    Width (mm):  22  Right ovary findings:     Right ovary:  Visualized    Diameter (mm):  23    Length (mm):  21    Width (mm):  22  Other findings:     Free pelvic fluid: not identified      Free peritoneal fluid: not identified    Post-Procedure Details:     Impression:  LT Complex cyst=20 x 18 x 19 mm    Tolerance:   Tolerated well, no immediate complications

## 2018-12-21 ENCOUNTER — OFFICE VISIT (OUTPATIENT)
Dept: SURGERY | Facility: CLINIC | Age: 43
End: 2018-12-21

## 2018-12-21 VITALS
DIASTOLIC BLOOD PRESSURE: 72 MMHG | BODY MASS INDEX: 32.13 KG/M2 | RESPIRATION RATE: 18 BRPM | HEART RATE: 94 BPM | TEMPERATURE: 97.9 F | WEIGHT: 212 LBS | SYSTOLIC BLOOD PRESSURE: 112 MMHG | HEIGHT: 68 IN

## 2018-12-21 DIAGNOSIS — L05.91 PILONIDAL CYST: Primary | ICD-10-CM

## 2018-12-21 PROCEDURE — 99024 POSTOP FOLLOW-UP VISIT: CPT | Performed by: SURGERY
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
## 2018-12-27 ENCOUNTER — OFFICE VISIT (OUTPATIENT)
Dept: OBGYN CLINIC | Facility: CLINIC | Age: 43
End: 2018-12-27
Payer: MEDICARE

## 2018-12-27 VITALS
WEIGHT: 216 LBS | BODY MASS INDEX: 31.99 KG/M2 | SYSTOLIC BLOOD PRESSURE: 116 MMHG | HEIGHT: 69 IN | DIASTOLIC BLOOD PRESSURE: 84 MMHG

## 2018-12-27 DIAGNOSIS — Z01.419 ENCOUNTER FOR GYNECOLOGICAL EXAMINATION: ICD-10-CM

## 2018-12-27 DIAGNOSIS — R87.612 LGSIL OF CERVIX OF UNDETERMINED SIGNIFICANCE: Primary | ICD-10-CM

## 2018-12-27 PROCEDURE — 88175 CYTOPATH C/V AUTO FLUID REDO: CPT | Performed by: OBSTETRICS & GYNECOLOGY

## 2018-12-27 PROCEDURE — 99214 OFFICE O/P EST MOD 30 MIN: CPT | Performed by: OBSTETRICS & GYNECOLOGY

## 2018-12-27 RX ORDER — DOXYCYCLINE HYCLATE 20 MG
20 TABLET ORAL 2 TIMES DAILY
Refills: 1 | COMMUNITY
Start: 2018-12-10 | End: 2021-04-21 | Stop reason: ALTCHOICE

## 2018-12-27 RX ORDER — PREDNISONE 20 MG/1
TABLET ORAL
Refills: 0 | COMMUNITY
Start: 2018-12-13 | End: 2019-10-23 | Stop reason: ALTCHOICE

## 2018-12-27 NOTE — PATIENT INSTRUCTIONS
Normal gynecological physical examination  Self-breast examination stressed  Discussed regular exercise, healthy diet, importance of vitamin D and calcium supplements  Discussed importance of sun block use during periods of prolonged sun exposure  Patient will be seen in 6 months for routine gynecologic and medical examination  Patient will call office for any problems, concerns, or issues which may arise during the interim      Will check follow-up pelvic ultrasound for small left ovarian cyst in 6-8 weeks

## 2018-12-27 NOTE — PROGRESS NOTES
Assessment/Plan:    Normal gynecological physical examination  Self-breast examination stressed  Discussed regular exercise, healthy diet, importance of vitamin D and calcium supplements  Discussed importance of sun block use during periods of prolonged sun exposure  Patient will be seen in 6 months for routine gynecologic and medical examination  Patient will call office for any problems, concerns, or issues which may arise during the interim  No problem-specific Assessment & Plan notes found for this encounter  Diagnoses and all orders for this visit:    LGSIL of cervix of undetermined significance  -     Liquid-based pap, diagnostic    Encounter for gynecological examination    Other orders  -     Cancel: Liquid-based pap, screening  -     doxycycline (PERIOSTAT) 20 MG tablet; Take 20 mg by mouth 2 (two) times a day  -     predniSONE 20 mg tablet; TAKE 3 TABS PER DAY ON DAYS 1-3, 2 TABS/DAY ON DAYS 4-6 AND 1 TAB/DAY ON DAYS 7-9        Subjective:      Patient ID: Cody Adjutant is a 38 y o  female  Patient is a 68-year-old female who presents today for follow-up of history of 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    In addition today she complains of mild bruising on the right breast    She denies any abnormal bleeding patterns    She also denies any significant pelvic or abdominal pain    Recent ultrasound showed a small left ovarian cyst and we will follow it up in 6-8 weeks to ensure resolution      Total time spent at today's visit was 25 min of which greater than 50% was spent face to face counseling and coordination of care        The following portions of the patient's history were reviewed and updated as appropriate: allergies, current medications, past family history, past medical history, past social history, past surgical history and problem list     Review of Systems   Constitutional: Negative  HENT: Negative  Eyes: Negative      Respiratory: Negative  Cardiovascular: Negative  Gastrointestinal: Negative  Endocrine: Negative  Genitourinary: Negative  Musculoskeletal: Negative  Skin: Negative  Neurological: Negative  Psychiatric/Behavioral: Negative  Objective:      /84 (BP Location: Right arm, Patient Position: Sitting, Cuff Size: Standard)   Ht 5' 8 5" (1 74 m)   Wt 98 kg (216 lb)   LMP 12/10/2018   BMI 32 36 kg/m²          Physical Exam   Constitutional: She is oriented to person, place, and time  She appears well-developed and well-nourished  HENT:   Head: Normocephalic  Eyes: Pupils are equal, round, and reactive to light  Neck: Normal range of motion  Cardiovascular: Normal rate and regular rhythm  Pulmonary/Chest: Effort normal and breath sounds normal  Right breast exhibits no inverted nipple, no mass, no nipple discharge, no skin change and no tenderness  Left breast exhibits no inverted nipple, no mass, no nipple discharge, no skin change and no tenderness  Breasts are symmetrical    Small bruise on the right breast at 7 o'clock    No retraction or mass effect appreciated       Abdominal: She exhibits no distension  There is no tenderness  There is no rebound  Genitourinary: Vagina normal and uterus normal  No labial fusion  There is no rash, tenderness, lesion or injury on the right labia  There is no rash, tenderness, lesion or injury on the left labia  Cervix exhibits no motion tenderness, no discharge and no friability  Right adnexum displays no mass, no tenderness and no fullness  Left adnexum displays no mass, no tenderness and no fullness  Musculoskeletal: Normal range of motion  Neurological: She is alert and oriented to person, place, and time  Skin: Skin is dry  Psychiatric: She has a normal mood and affect   Her speech is normal and behavior is normal  Judgment and thought content normal  Cognition and memory are normal

## 2019-01-02 LAB
LAB AP GYN PRIMARY INTERPRETATION: NORMAL
LAB AP LMP: NORMAL
Lab: NORMAL

## 2019-02-18 ENCOUNTER — TRANSCRIBE ORDERS (OUTPATIENT)
Dept: OBGYN CLINIC | Facility: CLINIC | Age: 44
End: 2019-02-18

## 2019-02-18 ENCOUNTER — TELEPHONE (OUTPATIENT)
Dept: OBGYN CLINIC | Facility: CLINIC | Age: 44
End: 2019-02-18

## 2019-02-18 DIAGNOSIS — N63.20 LUMP OF LEFT BREAST: Primary | ICD-10-CM

## 2019-02-18 NOTE — TELEPHONE ENCOUNTER
Patient already aware of mammo results and followup study scheduled    Orders sent to breast health services

## 2019-02-25 ENCOUNTER — ULTRASOUND (OUTPATIENT)
Dept: OBGYN CLINIC | Facility: CLINIC | Age: 44
End: 2019-02-25
Payer: MEDICARE

## 2019-02-25 DIAGNOSIS — N83.209 OVARIAN CYST RUPTURE: Primary | ICD-10-CM

## 2019-02-25 PROCEDURE — 76856 US EXAM PELVIC COMPLETE: CPT | Performed by: OBSTETRICS & GYNECOLOGY

## 2019-02-25 NOTE — PROGRESS NOTES
AMB US Pelvic Non OB  Date/Time: 2/25/2019 11:50 AM  Performed by: Annia Setting  Authorized by: Maris Aguero MD     Procedure details:     Indications: ovarian cysts and non-obstetric abdominal pain      Technique:  US Pelvic, Non-OB with complete exam  Uterine findings:     Diameter (mm):  104    Length (mm):  57    Width (mm):  62    Adnexal mass: not identified      Polyps: not identified      Myomas: identified      Endometrial stripe: identified      Endometrial hyperplasia: not identified      Endometrium thickness (mm):  11  Left ovary findings:     Left ovary:  Visualized    Diameter (mm):  29    Length (mm):  29    Width (mm):  19  Right ovary findings:     Right ovary:  Visualized    Diameter (mm):  29    Length (mm):  28    Width (mm):  20  Other findings:     Free pelvic fluid: not identified      Free peritoneal fluid: not identified    Post-Procedure Details:     Impression:  No cyst seen today  Small post fibroid=26 x 25 x 21 mm    Tolerance:   Tolerated well, no immediate complications

## 2019-02-25 NOTE — PROGRESS NOTES
Ovarian cyst has resolved on ultrasound    Patient may return to routine follow-up as planned    Thanks

## 2019-03-07 ENCOUNTER — TELEPHONE (OUTPATIENT)
Dept: OBGYN CLINIC | Facility: CLINIC | Age: 44
End: 2019-03-07

## 2019-04-15 ENCOUNTER — TELEPHONE (OUTPATIENT)
Dept: OBGYN CLINIC | Facility: CLINIC | Age: 44
End: 2019-04-15

## 2019-04-22 ENCOUNTER — ULTRASOUND (OUTPATIENT)
Dept: OBGYN CLINIC | Facility: CLINIC | Age: 44
End: 2019-04-22
Payer: MEDICARE

## 2019-04-22 DIAGNOSIS — R10.2 PELVIC PAIN IN FEMALE: Primary | ICD-10-CM

## 2019-04-22 DIAGNOSIS — N83.292 COMPLEX CYST OF LEFT OVARY: ICD-10-CM

## 2019-04-22 PROCEDURE — 76856 US EXAM PELVIC COMPLETE: CPT | Performed by: OBSTETRICS & GYNECOLOGY

## 2019-04-30 DIAGNOSIS — B37.9 YEAST INFECTION: Primary | ICD-10-CM

## 2019-04-30 RX ORDER — FLUCONAZOLE 150 MG/1
150 TABLET ORAL ONCE
Qty: 1 TABLET | Refills: 1 | Status: SHIPPED | OUTPATIENT
Start: 2019-04-30 | End: 2019-04-30

## 2019-05-13 ENCOUNTER — ULTRASOUND (OUTPATIENT)
Dept: OBGYN CLINIC | Facility: CLINIC | Age: 44
End: 2019-05-13
Payer: MEDICARE

## 2019-05-13 DIAGNOSIS — R10.2 PELVIC PAIN IN FEMALE: ICD-10-CM

## 2019-05-13 DIAGNOSIS — N83.292 COMPLEX CYST OF LEFT OVARY: Primary | ICD-10-CM

## 2019-05-13 PROCEDURE — 76856 US EXAM PELVIC COMPLETE: CPT | Performed by: OBSTETRICS & GYNECOLOGY

## 2019-05-24 ENCOUNTER — TELEPHONE (OUTPATIENT)
Dept: OBGYN CLINIC | Facility: CLINIC | Age: 44
End: 2019-05-24

## 2019-07-01 ENCOUNTER — ANNUAL EXAM (OUTPATIENT)
Dept: OBGYN CLINIC | Facility: CLINIC | Age: 44
End: 2019-07-01
Payer: MEDICARE

## 2019-07-01 VITALS — BODY MASS INDEX: 32.66 KG/M2 | WEIGHT: 218 LBS | SYSTOLIC BLOOD PRESSURE: 128 MMHG | DIASTOLIC BLOOD PRESSURE: 76 MMHG

## 2019-07-01 DIAGNOSIS — Z01.411 ENCOUNTER FOR GYNECOLOGICAL EXAMINATION WITH ABNORMAL FINDING: ICD-10-CM

## 2019-07-01 DIAGNOSIS — Z12.39 SCREENING FOR BREAST CANCER: ICD-10-CM

## 2019-07-01 DIAGNOSIS — Z11.3 SCREENING FOR STD (SEXUALLY TRANSMITTED DISEASE): Primary | ICD-10-CM

## 2019-07-01 DIAGNOSIS — R39.9 LOWER URINARY TRACT SYMPTOMS (LUTS): ICD-10-CM

## 2019-07-01 DIAGNOSIS — Z01.419 PAP SMEAR, AS PART OF ROUTINE GYNECOLOGICAL EXAMINATION: ICD-10-CM

## 2019-07-01 PROCEDURE — 87510 GARDNER VAG DNA DIR PROBE: CPT | Performed by: OBSTETRICS & GYNECOLOGY

## 2019-07-01 PROCEDURE — 87624 HPV HI-RISK TYP POOLED RSLT: CPT | Performed by: OBSTETRICS & GYNECOLOGY

## 2019-07-01 PROCEDURE — 87255 GENET VIRUS ISOLATE HSV: CPT | Performed by: OBSTETRICS & GYNECOLOGY

## 2019-07-01 PROCEDURE — 87480 CANDIDA DNA DIR PROBE: CPT | Performed by: OBSTETRICS & GYNECOLOGY

## 2019-07-01 PROCEDURE — G0124 SCREEN C/V THIN LAYER BY MD: HCPCS | Performed by: PATHOLOGY

## 2019-07-01 PROCEDURE — 87660 TRICHOMONAS VAGIN DIR PROBE: CPT | Performed by: OBSTETRICS & GYNECOLOGY

## 2019-07-01 PROCEDURE — G0145 SCR C/V CYTO,THINLAYER,RESCR: HCPCS | Performed by: PATHOLOGY

## 2019-07-01 PROCEDURE — G0101 CA SCREEN;PELVIC/BREAST EXAM: HCPCS | Performed by: OBSTETRICS & GYNECOLOGY

## 2019-07-01 PROCEDURE — 87086 URINE CULTURE/COLONY COUNT: CPT | Performed by: OBSTETRICS & GYNECOLOGY

## 2019-07-01 PROCEDURE — 87591 N.GONORRHOEAE DNA AMP PROB: CPT | Performed by: OBSTETRICS & GYNECOLOGY

## 2019-07-01 PROCEDURE — 87491 CHLMYD TRACH DNA AMP PROBE: CPT | Performed by: OBSTETRICS & GYNECOLOGY

## 2019-07-01 NOTE — PROGRESS NOTES
Assessment/Plan:    No problem-specific Assessment & Plan notes found for this encounter  Normal gynecological physical examination  Self-breast examination stressed  Mammogram ordered  Discussed regular exercise, healthy diet, importance of vitamin D and calcium supplements  Discussed importance of sun block use during periods of prolonged sun exposure  Patient will be seen in 1 year for routine gynecologic and medical examination  Patient will call office for any problems, concerns, or issues which may arise during the interim  Diagnoses and all orders for this visit:    Screening for STD (sexually transmitted disease)  -     Chlamydia/GC amplified DNA by PCR  -     VAGINOSIS DNA PROBE (AFFIRM)  -     RPR; Future  -     Hepatitis panel, acute; Future  -     Herpes simplex virus culture  -     HIV 1/2 AG-AB combo; Future    Screening for breast cancer  -     Mammo screening bilateral w cad; Future    Encounter for gynecological examination with abnormal finding  -     Liquid-based pap, screening    Pap smear, as part of routine gynecological examination  -     Liquid-based pap, screening    Lower urinary tract symptoms (LUTS)  -     Urine culture          Subjective:      Patient ID: Loida Orlando is a 37 y o  female  Patient is a 40-year-old female who presents today for her annual gynecologic and medical examination    Periods are now occurring approximately every 26 days  They last several days and her accompanied by moderate cramping  Patient denies any erratic intermenstrual bleeding however  Patient is entering a new relationship and would like complete STD screening at today's visit  Appropriate cultures and blood work will be obtained  Patient reports normal bowel and bladder habits    She denies any significant pelvic or abdominal pain at this time    She does report vague vaginal irritation and discomfort    Appropriate treatment will be made should any of the cultures B positive  Patient does regular self-breast examinations and is up-to-date with screening mammography  Appropriate scheduling for her annual mammogram was made at today's visit  The following portions of the patient's history were reviewed and updated as appropriate: allergies, current medications, past family history, past medical history, past social history, past surgical history and problem list     Review of Systems   Constitutional: Negative  HENT: Negative  Eyes: Negative  Respiratory: Negative  Cardiovascular: Negative  Gastrointestinal: Negative  Endocrine: Negative  Genitourinary: Negative  Musculoskeletal: Negative  Skin: Negative  Neurological: Negative  Followed for migraines   Psychiatric/Behavioral: Negative  Objective:      /76   Wt 98 9 kg (218 lb)   LMP 06/12/2019   BMI 32 66 kg/m²          Physical Exam   Constitutional: She appears well-developed  HENT:   Head: Normocephalic  Eyes: Pupils are equal, round, and reactive to light  Neck: Normal range of motion  Neck supple  Cardiovascular: Normal rate and regular rhythm  Pulmonary/Chest: Effort normal and breath sounds normal  Right breast exhibits no inverted nipple, no mass, no nipple discharge, no skin change and no tenderness  Left breast exhibits no inverted nipple, no mass, no nipple discharge, no skin change and no tenderness  No breast swelling, tenderness, discharge or bleeding  Breasts are symmetrical    Abdominal: Soft  Normal appearance and bowel sounds are normal    Genitourinary: Rectum normal, vagina normal and uterus normal  Pelvic exam was performed with patient supine  Right adnexum displays no mass, no tenderness and no fullness  Left adnexum displays no mass, no tenderness and no fullness  No vaginal discharge found  Lymphadenopathy:     She has no cervical adenopathy  She has no axillary adenopathy          Right: No inguinal and no supraclavicular adenopathy present  Left: No inguinal and no supraclavicular adenopathy present  Neurological: She is alert  Skin: Skin is intact  No rash noted  Psychiatric: She has a normal mood and affect   Her speech is normal and behavior is normal  Judgment and thought content normal  Cognition and memory are normal

## 2019-07-02 LAB — BACTERIA UR CULT: NORMAL

## 2019-07-03 LAB
C TRACH DNA SPEC QL NAA+PROBE: NEGATIVE
CANDIDA RRNA VAG QL PROBE: NEGATIVE
G VAGINALIS RRNA GENITAL QL PROBE: NEGATIVE
HSV SPEC CULT: NORMAL
N GONORRHOEA DNA SPEC QL NAA+PROBE: NEGATIVE
T VAGINALIS RRNA GENITAL QL PROBE: NEGATIVE

## 2019-07-10 LAB
LAB AP GYN PRIMARY INTERPRETATION: ABNORMAL
Lab: ABNORMAL
PATH INTERP SPEC-IMP: ABNORMAL

## 2019-07-11 LAB
HPV HR 12 DNA CVX QL NAA+PROBE: NEGATIVE
HPV16 DNA CVX QL NAA+PROBE: NEGATIVE
HPV18 DNA CVX QL NAA+PROBE: NEGATIVE

## 2019-07-16 ENCOUNTER — TELEPHONE (OUTPATIENT)
Dept: OBGYN CLINIC | Facility: CLINIC | Age: 44
End: 2019-07-16

## 2019-07-17 ENCOUNTER — TELEPHONE (OUTPATIENT)
Dept: OBGYN CLINIC | Facility: CLINIC | Age: 44
End: 2019-07-17

## 2019-07-17 DIAGNOSIS — N95.2 VAGINAL ATROPHY: Primary | ICD-10-CM

## 2019-07-18 RX ORDER — ESTRADIOL 0.1 MG/G
1 CREAM VAGINAL DAILY
Qty: 15 G | Refills: 1 | Status: SHIPPED | OUTPATIENT
Start: 2019-07-18 | End: 2020-06-17

## 2019-07-24 ENCOUNTER — TELEPHONE (OUTPATIENT)
Dept: OBGYN CLINIC | Facility: CLINIC | Age: 44
End: 2019-07-24

## 2019-07-24 NOTE — TELEPHONE ENCOUNTER
----- Message from Gage Vang MD sent at 7/23/2019  1:33 PM EDT -----  Cultures were all negative    Pap smear was stable    HPV screening was negative    Please ask if she got her prescription for the estrogen vaginal cream    Thanks

## 2019-07-24 NOTE — TELEPHONE ENCOUNTER
----- Message from Cherly Leventhal, MD sent at 7/23/2019  1:33 PM EDT -----  Cultures were all negative    Pap smear was stable    HPV screening was negative    Please ask if she got her prescription for the estrogen vaginal cream    Thanks

## 2019-08-01 ENCOUNTER — ULTRASOUND (OUTPATIENT)
Dept: OBGYN CLINIC | Facility: CLINIC | Age: 44
End: 2019-08-01
Payer: MEDICARE

## 2019-08-01 DIAGNOSIS — N83.292 COMPLEX CYST OF LEFT OVARY: Primary | ICD-10-CM

## 2019-08-01 DIAGNOSIS — R10.2 PELVIC PAIN IN FEMALE: ICD-10-CM

## 2019-08-01 PROCEDURE — 76856 US EXAM PELVIC COMPLETE: CPT | Performed by: OBSTETRICS & GYNECOLOGY

## 2019-08-01 NOTE — PROGRESS NOTES
AMB US Pelvic Non OB  Date/Time: 8/1/2019 2:01 PM  Performed by: Fernanda Valencia  Authorized by: Mor Nuñez MD     Procedure details:     Indications: ovarian cysts and non-obstetric abdominal pain      Technique:  US Pelvic, Non-OB with complete exam  Uterine findings:     Length (cm): 89    Height (cm):  65    Width (cm):  55    Uterine adhesions: not identified      Adnexal mass: identified      Location:  Mass left    Polyps: not identified      Myomas: identified      Endometrial stripe: identified      Endometrial hyperplasia: not identified      Endometrium thickness (mm):  11  Left ovary findings:     Left ovary:  Visualized    Cysts: not identified      Length (cm): 24    Height (cm): 23    Width (cm): 21  Right ovary findings:     Right ovary:  Visualized    Cysts: not identified      Length (cm): 25    Height (cm): 24    Width (cm): 22  Other findings:     Free pelvic fluid: not identified      Free peritoneal fluid: not identified    Post-Procedure Details:     Impression:  LT Hemm foll/cyst=19 x 15 x 14 mm  Fibroids=Post degen=23 x 22 x 22 mm,ANT=24 x 23 x 23 mm    Tolerance:   Tolerated well, no immediate complications

## 2019-08-25 NOTE — PROGRESS NOTES
Ultrasound results showed only a small normal ovarian cyst    Patient to return for normal gyn follow-up in 6 months    Thanks

## 2019-09-13 DIAGNOSIS — B37.9 YEAST INFECTION: Primary | ICD-10-CM

## 2019-09-13 RX ORDER — FLUCONAZOLE 150 MG/1
150 TABLET ORAL ONCE
Qty: 1 TABLET | Refills: 1 | Status: SHIPPED | OUTPATIENT
Start: 2019-09-13 | End: 2019-09-13

## 2019-09-13 NOTE — TELEPHONE ENCOUNTER
Pt called asking to speak with Dr Murlean Epley nurse, would not tell what she wanted to speak about  Please call pt this morning

## 2019-10-23 ENCOUNTER — OFFICE VISIT (OUTPATIENT)
Dept: OBGYN CLINIC | Facility: CLINIC | Age: 44
End: 2019-10-23
Payer: MEDICARE

## 2019-10-23 VITALS
HEIGHT: 69 IN | WEIGHT: 222.4 LBS | DIASTOLIC BLOOD PRESSURE: 90 MMHG | SYSTOLIC BLOOD PRESSURE: 124 MMHG | BODY MASS INDEX: 32.94 KG/M2

## 2019-10-23 DIAGNOSIS — N76.0 ACUTE VAGINITIS: Primary | ICD-10-CM

## 2019-10-23 DIAGNOSIS — R35.0 URINARY FREQUENCY: ICD-10-CM

## 2019-10-23 LAB
SL AMB  POCT GLUCOSE, UA: ABNORMAL
SL AMB LEUKOCYTE ESTERASE,UA: ABNORMAL
SL AMB POCT BILIRUBIN,UA: ABNORMAL
SL AMB POCT BLOOD,UA: ABNORMAL
SL AMB POCT CLARITY,UA: CLEAR
SL AMB POCT COLOR,UA: YELLOW
SL AMB POCT KETONES,UA: ABNORMAL
SL AMB POCT NITRITE,UA: ABNORMAL
SL AMB POCT PH,UA: 5
SL AMB POCT SPECIFIC GRAVITY,UA: 1.02
SL AMB POCT URINE PROTEIN: ABNORMAL
SL AMB POCT UROBILINOGEN: ABNORMAL

## 2019-10-23 PROCEDURE — 99214 OFFICE O/P EST MOD 30 MIN: CPT | Performed by: PHYSICIAN ASSISTANT

## 2019-10-23 PROCEDURE — 87086 URINE CULTURE/COLONY COUNT: CPT | Performed by: PHYSICIAN ASSISTANT

## 2019-10-23 PROCEDURE — 81002 URINALYSIS NONAUTO W/O SCOPE: CPT | Performed by: PHYSICIAN ASSISTANT

## 2019-10-23 PROCEDURE — 87070 CULTURE OTHR SPECIMN AEROBIC: CPT | Performed by: PHYSICIAN ASSISTANT

## 2019-10-23 RX ORDER — ELETRIPTAN HYDROBROMIDE 40 MG/1
TABLET, FILM COATED ORAL
Refills: 3 | COMMUNITY
Start: 2019-10-19

## 2019-10-23 RX ORDER — IBUPROFEN 600 MG/1
600 TABLET ORAL EVERY 6 HOURS PRN
COMMUNITY
Start: 2019-10-21 | End: 2021-02-04

## 2019-10-23 RX ORDER — MULTIVITAMIN WITH IRON
2 TABLET ORAL
COMMUNITY

## 2019-10-23 RX ORDER — TOPIRAMATE 25 MG/1
100 TABLET ORAL
COMMUNITY
Start: 2014-03-22 | End: 2020-02-04

## 2019-10-23 NOTE — PROGRESS NOTES
Assessment/Plan:    No problem-specific Assessment & Plan notes found for this encounter  Diagnoses and all orders for this visit:    Acute vaginitis  -     Genital Comprehensive Culture    Urinary frequency  -     Urine culture  -     POCT urine dip    Other orders  -     Cancel: VAGINOSIS DNA PROBE (AFFIRM)  -     Cholecalciferol (VITAMIN D3) 5000 units CHEW; Chew  -     Magnesium 250 MG TABS; Take 2 tablets by mouth  -     VENTOLIN  (90 Base) MCG/ACT inhaler; INHALE 1-2 PUFFS EVERY 6 (SIX) HOURS AS NEEDED FOR WHEEZING  IF FREQUENT USE, SEEK MEDICAL CARE  -     eletriptan (RELPAX) 40 MG tablet; PLEASE SEE ATTACHED FOR DETAILED DIRECTIONS  -     ibuprofen (MOTRIN) 600 mg tablet; Take 600 mg by mouth every 6 (six) hours as needed  -     topiramate (TOPAMAX) 25 mg tablet; Take 100 mg by mouth        Urine dip positive for trace protein, trace blood, and trace leukocyte esterase  Sample sent for culture  Findings could be due to kidney stones  Vaginal cultures done  We will call with results and treat as necessary  Call if symptoms worsen or change  Subjective:      Patient ID: Haritha Anderson is a 37 y o  female  Patient is here with complaint of probable vaginal infection  States symptoms started last month after a course of antibiotics  Complains of vaginal discharge, odor, and itching  Also notes urinary frequency, particularly at night  Patient states she was told she has stones in her R kidney  Took Diflucan and tried OTC Monistat, but these only cleared symptoms for a few days  Has a history of recurrent infections  Patient denies further urinary symptoms, pelvic pain, abdominal pain, n/v, and fever/chills        The following portions of the patient's history were reviewed and updated as appropriate: allergies, current medications, past family history, past medical history, past social history, past surgical history and problem list     Review of Systems   Constitutional: Negative for chills and fever  Gastrointestinal: Negative for abdominal distention, abdominal pain, nausea and vomiting  Genitourinary: Positive for frequency and vaginal discharge  Negative for difficulty urinating, dysuria, flank pain, genital sores, hematuria, menstrual problem, pelvic pain, urgency, vaginal bleeding (With odor and itching) and vaginal pain  Objective:      /90   Ht 5' 9" (1 753 m)   Wt 101 kg (222 lb 6 4 oz)   BMI 32 84 kg/m²          Physical Exam   Constitutional: She is oriented to person, place, and time  Vital signs are normal  She appears well-developed and well-nourished  Genitourinary: Vagina normal and uterus normal  No labial fusion  There is no rash, tenderness, lesion or injury on the right labia  There is no rash, tenderness, lesion or injury on the left labia  Cervix exhibits no motion tenderness, no discharge and no friability  Right adnexum displays no mass, no tenderness and no fullness  Left adnexum displays no mass, no tenderness and no fullness  No erythema, tenderness or bleeding in the vagina  No vaginal discharge found  Lymphadenopathy: No inguinal adenopathy noted on the right or left side  Neurological: She is alert and oriented to person, place, and time  Skin: Skin is warm and dry  Psychiatric: She has a normal mood and affect  Her behavior is normal  Judgment and thought content normal    Vitals reviewed

## 2019-10-24 LAB — BACTERIA UR CULT: NORMAL

## 2019-10-25 ENCOUNTER — TELEPHONE (OUTPATIENT)
Dept: OBGYN CLINIC | Facility: CLINIC | Age: 44
End: 2019-10-25

## 2019-10-25 DIAGNOSIS — N89.8 VAGINAL ITCHING: Primary | ICD-10-CM

## 2019-10-25 LAB — BACTERIA GENITAL AEROBE CULT: NORMAL

## 2019-10-25 RX ORDER — FLUCONAZOLE 150 MG/1
150 TABLET ORAL ONCE
Qty: 1 TABLET | Refills: 0 | Status: SHIPPED | OUTPATIENT
Start: 2019-10-25 | End: 2019-10-25

## 2019-10-25 NOTE — TELEPHONE ENCOUNTER
Spoke with patient regarding vaginal and urine culture results - negative  Patient still complaining of itching and burning  Rx for Diflucan x 1 dose sent to pharmacy  Call if symptoms do not resolve

## 2019-12-09 ENCOUNTER — OFFICE VISIT (OUTPATIENT)
Dept: OBGYN CLINIC | Facility: CLINIC | Age: 44
End: 2019-12-09
Payer: MEDICARE

## 2019-12-09 VITALS — WEIGHT: 223 LBS | SYSTOLIC BLOOD PRESSURE: 118 MMHG | BODY MASS INDEX: 32.93 KG/M2 | DIASTOLIC BLOOD PRESSURE: 80 MMHG

## 2019-12-09 DIAGNOSIS — R35.0 FREQUENT URINATION: Primary | ICD-10-CM

## 2019-12-09 DIAGNOSIS — N89.8 VAGINAL DISCHARGE: ICD-10-CM

## 2019-12-09 DIAGNOSIS — R21 RASH OF GENITAL AREA: ICD-10-CM

## 2019-12-09 LAB
SL AMB  POCT GLUCOSE, UA: ABNORMAL
SL AMB LEUKOCYTE ESTERASE,UA: 125
SL AMB POCT BILIRUBIN,UA: ABNORMAL
SL AMB POCT BLOOD,UA: ABNORMAL
SL AMB POCT CLARITY,UA: ABNORMAL
SL AMB POCT COLOR,UA: YELLOW
SL AMB POCT KETONES,UA: ABNORMAL
SL AMB POCT NITRITE,UA: ABNORMAL
SL AMB POCT PH,UA: 5
SL AMB POCT SPECIFIC GRAVITY,UA: 1.01
SL AMB POCT URINE PROTEIN: 15
SL AMB POCT UROBILINOGEN: 0.2

## 2019-12-09 PROCEDURE — 81002 URINALYSIS NONAUTO W/O SCOPE: CPT | Performed by: OBSTETRICS & GYNECOLOGY

## 2019-12-09 PROCEDURE — 87070 CULTURE OTHR SPECIMN AEROBIC: CPT | Performed by: OBSTETRICS & GYNECOLOGY

## 2019-12-09 PROCEDURE — 87491 CHLMYD TRACH DNA AMP PROBE: CPT | Performed by: OBSTETRICS & GYNECOLOGY

## 2019-12-09 PROCEDURE — 87086 URINE CULTURE/COLONY COUNT: CPT | Performed by: OBSTETRICS & GYNECOLOGY

## 2019-12-09 PROCEDURE — 99214 OFFICE O/P EST MOD 30 MIN: CPT | Performed by: OBSTETRICS & GYNECOLOGY

## 2019-12-09 PROCEDURE — 87591 N.GONORRHOEAE DNA AMP PROB: CPT | Performed by: OBSTETRICS & GYNECOLOGY

## 2019-12-09 NOTE — PATIENT INSTRUCTIONS
Encouraged pt to do warm tea bag soaks  Ordered Topicort cream   Will wait for urine and vaginal culture results for any further tx  Pt informed to call office for any issues

## 2019-12-09 NOTE — PROGRESS NOTES
Assessment/Plan:    Encouraged pt to do warm tea bag soaks  Ordered Topicort cream   Will wait for urine and vaginal culture results for any further tx  Pt informed to call office for any issues  No problem-specific Assessment & Plan notes found for this encounter  Diagnoses and all orders for this visit:    Frequent urination  -     Urine culture  -     POCT urine dip          Subjective:      Patient ID: Coleman Arana is a 37 y o  female  Pt is a 42y/o female who presents today for UTI symptoms and vaginal rash  Pt reports crampy suprapubic pain, dysuria, odor, and urinary frequency and urgency x 2-3 days  She reports cloudy urine and profuse yellowish discharge that requires her to use 3 pantyliners per day  She also reports pruritus of the genital area, a rash within the L labia,     She reports h/o yeast infections and ovarian cysts in the past but her symptoms now feel different  She denies fever, chills, hematuria, vaginal pain, flank pain, or changes in bowel habits  Urine dip and vaginal culture performed  Urine dip revealed +leukocytes  Urine culture ordered  The following portions of the patient's history were reviewed and updated as appropriate: allergies, current medications, past family history, past medical history, past social history, past surgical history and problem list      Review of Systems   Constitutional: Negative  Negative for appetite change, diaphoresis, fatigue, fever and unexpected weight change  HENT: Negative  Eyes: Negative  Respiratory: Negative  Negative for shortness of breath  Cardiovascular: Negative  Negative for chest pain  Gastrointestinal: Negative  Negative for abdominal pain, blood in stool, constipation, diarrhea, nausea and vomiting  Endocrine: Negative  Negative for cold intolerance and heat intolerance  Genitourinary: Positive for dysuria, frequency, pelvic pain, urgency and vaginal discharge   Negative for hematuria, vaginal bleeding and vaginal pain  Musculoskeletal: Negative  Skin: Negative  Allergic/Immunologic: Negative  Neurological: Negative  Hematological: Negative  Negative for adenopathy  Psychiatric/Behavioral: Negative  Objective:      /80   Wt 101 kg (223 lb)   BMI 32 93 kg/m²          Physical Exam   Constitutional: She appears well-developed  HENT:   Head: Normocephalic  Eyes: Pupils are equal, round, and reactive to light  Neck: Normal range of motion  Neck supple  Cardiovascular: Normal rate and regular rhythm  Pulmonary/Chest: Effort normal and breath sounds normal  Right breast exhibits no inverted nipple, no mass, no nipple discharge, no skin change and no tenderness  Left breast exhibits no inverted nipple, no mass, no nipple discharge, no skin change and no tenderness  Breasts are symmetrical    Abdominal: Soft  Normal appearance and bowel sounds are normal  There is tenderness in the suprapubic area  There is no CVA tenderness  Genitourinary: Pelvic exam was performed with patient supine  There is rash on the right labia  There is no lesion on the right labia  There is rash on the left labia  There is no lesion on the left labia  Uterus is not enlarged and not tender  Cervix exhibits no discharge and no friability  Right adnexum displays no mass, no tenderness and no fullness  Left adnexum displays no mass, no tenderness and no fullness  No erythema, tenderness or bleeding in the vagina  Vaginal discharge found  Genitourinary Comments: Erythema of the labia  White vaginal discharge     Lymphadenopathy:     She has no cervical adenopathy  She has no axillary adenopathy  Right: No inguinal and no supraclavicular adenopathy present  Left: No inguinal and no supraclavicular adenopathy present  Neurological: She is alert  Skin: Skin is intact  No rash noted  Psychiatric: She has a normal mood and affect   Her speech is normal and behavior is normal  Judgment and thought content normal  Cognition and memory are normal

## 2019-12-10 LAB
BACTERIA UR CULT: NORMAL
C TRACH DNA SPEC QL NAA+PROBE: NEGATIVE
N GONORRHOEA DNA SPEC QL NAA+PROBE: NEGATIVE

## 2019-12-10 RX ORDER — DESOXIMETASONE 2.5 MG/G
CREAM TOPICAL 2 TIMES DAILY
Qty: 30 G | Refills: 0 | Status: SHIPPED | OUTPATIENT
Start: 2019-12-10

## 2019-12-11 LAB — BACTERIA GENITAL AEROBE CULT: NORMAL

## 2020-01-02 ENCOUNTER — TELEPHONE (OUTPATIENT)
Dept: OBGYN CLINIC | Facility: CLINIC | Age: 45
End: 2020-01-02

## 2020-01-02 NOTE — TELEPHONE ENCOUNTER
----- Message from Mansoor Chiang MD sent at 12/31/2019 12:36 AM EST -----  All cultures including urine were negative    Thanks
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
## 2020-02-04 ENCOUNTER — OFFICE VISIT (OUTPATIENT)
Dept: BARIATRICS | Facility: CLINIC | Age: 45
End: 2020-02-04
Payer: MEDICARE

## 2020-02-04 VITALS
WEIGHT: 231.3 LBS | HEART RATE: 83 BPM | HEIGHT: 68 IN | DIASTOLIC BLOOD PRESSURE: 78 MMHG | BODY MASS INDEX: 35.06 KG/M2 | SYSTOLIC BLOOD PRESSURE: 122 MMHG | TEMPERATURE: 98.6 F

## 2020-02-04 DIAGNOSIS — G44.309 POST-TRAUMATIC HEADACHE, NOT INTRACTABLE: ICD-10-CM

## 2020-02-04 DIAGNOSIS — R63.5 ABNORMAL WEIGHT GAIN: ICD-10-CM

## 2020-02-04 DIAGNOSIS — E66.9 OBESITY, CLASS I, BMI 30-34.9: Primary | ICD-10-CM

## 2020-02-04 DIAGNOSIS — E28.2 POLYCYSTIC OVARIES: ICD-10-CM

## 2020-02-04 PROBLEM — Z98.1 S/P CERVICAL SPINAL FUSION: Status: ACTIVE | Noted: 2019-03-26

## 2020-02-04 PROBLEM — Z98.890 S/P ROTATOR CUFF REPAIR: Status: ACTIVE | Noted: 2019-03-06

## 2020-02-04 PROBLEM — H53.40 VISUAL FIELD LOSS: Status: ACTIVE | Noted: 2019-09-10

## 2020-02-04 PROBLEM — S63.408A RUPTURE OF FLEXOR SHEATH PULLEY OF FINGER: Status: ACTIVE | Noted: 2018-10-31

## 2020-02-04 PROBLEM — R29.898 WEAKNESS OF SHOULDER: Status: ACTIVE | Noted: 2019-05-02

## 2020-02-04 PROBLEM — Z63.8 STRESS DUE TO FAMILY TENSION: Status: ACTIVE | Noted: 2019-05-02

## 2020-02-04 PROBLEM — R60.9 PERIPHERAL EDEMA: Status: ACTIVE | Noted: 2019-05-02

## 2020-02-04 PROBLEM — R60.0 PERIPHERAL EDEMA: Status: ACTIVE | Noted: 2019-05-02

## 2020-02-04 PROBLEM — M70.60 TROCHANTERIC BURSITIS: Status: ACTIVE | Noted: 2019-05-02

## 2020-02-04 PROBLEM — M75.21 BICIPITAL TENDINITIS OF RIGHT SHOULDER: Status: ACTIVE | Noted: 2019-05-02

## 2020-02-04 PROCEDURE — 99203 OFFICE O/P NEW LOW 30 MIN: CPT | Performed by: FAMILY MEDICINE

## 2020-02-04 RX ORDER — TRIAMCINOLONE ACETONIDE 1 MG/G
CREAM TOPICAL
COMMUNITY
Start: 2020-01-29

## 2020-02-04 RX ORDER — DOXYCYCLINE HYCLATE 100 MG
100 TABLET ORAL
COMMUNITY
Start: 2018-07-17

## 2020-02-12 ENCOUNTER — TELEPHONE (OUTPATIENT)
Dept: BARIATRICS | Facility: CLINIC | Age: 45
End: 2020-02-12

## 2020-02-12 NOTE — TELEPHONE ENCOUNTER
Her lab was normal  (normal kidney and electrolytes)  She can do VLCD> not sure why she cancelled her apt with RD today as her labs were in the system     Please schedule her for VLCD

## 2020-02-12 NOTE — TELEPHONE ENCOUNTER
Pt here in the office asking for results to labs done 2/5/2020  Please advise   Pt asking for a call back

## 2020-02-14 ENCOUNTER — TELEPHONE (OUTPATIENT)
Dept: OBGYN CLINIC | Facility: CLINIC | Age: 45
End: 2020-02-14

## 2020-02-14 NOTE — TELEPHONE ENCOUNTER
Patient called regarding current period  States it started on 2/10, when it was supposed to  Bleeding today has been heavy with small clots  Only experiencing mild cramping  Counseled patient that this can happen in perimenopause  Will continue to monitor  If bleeding becomes heavier, or does not hank in 12-18 hours, or if severe cramping or pain develop, patient to be seen in the ED

## 2020-06-17 DIAGNOSIS — N95.2 VAGINAL ATROPHY: ICD-10-CM

## 2020-06-17 RX ORDER — ESTRADIOL 0.1 MG/G
CREAM VAGINAL
Qty: 42.5 G | Refills: 1 | Status: SHIPPED | OUTPATIENT
Start: 2020-06-17

## 2020-07-21 ENCOUNTER — ANNUAL EXAM (OUTPATIENT)
Dept: OBGYN CLINIC | Facility: CLINIC | Age: 45
End: 2020-07-21
Payer: MEDICARE

## 2020-07-21 VITALS
TEMPERATURE: 99.6 F | BODY MASS INDEX: 36.98 KG/M2 | DIASTOLIC BLOOD PRESSURE: 84 MMHG | WEIGHT: 244 LBS | HEIGHT: 68 IN | SYSTOLIC BLOOD PRESSURE: 126 MMHG

## 2020-07-21 DIAGNOSIS — Z01.419 ENCNTR FOR GYN EXAM (GENERAL) (ROUTINE) W/O ABN FINDINGS: Primary | ICD-10-CM

## 2020-07-21 DIAGNOSIS — N76.0 ACUTE VAGINITIS: ICD-10-CM

## 2020-07-21 DIAGNOSIS — Z12.39 BREAST CANCER SCREENING: ICD-10-CM

## 2020-07-21 DIAGNOSIS — Z11.3 SCREENING FOR STD (SEXUALLY TRANSMITTED DISEASE): ICD-10-CM

## 2020-07-21 PROCEDURE — 87147 CULTURE TYPE IMMUNOLOGIC: CPT | Performed by: OBSTETRICS & GYNECOLOGY

## 2020-07-21 PROCEDURE — 87070 CULTURE OTHR SPECIMN AEROBIC: CPT | Performed by: OBSTETRICS & GYNECOLOGY

## 2020-07-21 PROCEDURE — G0101 CA SCREEN;PELVIC/BREAST EXAM: HCPCS | Performed by: OBSTETRICS & GYNECOLOGY

## 2020-07-21 PROCEDURE — G0145 SCR C/V CYTO,THINLAYER,RESCR: HCPCS | Performed by: OBSTETRICS & GYNECOLOGY

## 2020-07-21 PROCEDURE — 87491 CHLMYD TRACH DNA AMP PROBE: CPT | Performed by: OBSTETRICS & GYNECOLOGY

## 2020-07-21 PROCEDURE — 87591 N.GONORRHOEAE DNA AMP PROB: CPT | Performed by: OBSTETRICS & GYNECOLOGY

## 2020-07-21 RX ORDER — ERENUMAB-AOOE 70 MG/ML
INJECTION SUBCUTANEOUS
COMMUNITY
Start: 2020-05-12

## 2020-07-21 NOTE — PROGRESS NOTES
Assessment/Plan:    No problem-specific Assessment & Plan notes found for this encounter  Diagnoses and all orders for this visit:    Encntr for gyn exam (general) (routine) w/o abn findings  -     Liquid-based pap, screening    Breast cancer screening  -     Mammo screening bilateral w 3d & cad; Future    Screening for STD (sexually transmitted disease)  -     Chlamydia/GC amplified DNA by PCR    Acute vaginitis  -     Genital Comprehensive Culture    Other orders  -     AIMOVIG 70 MG/ML SOAJ; INJECT 1 ML (70 MG TOTAL) UNDER THE SKIN EVERY 28 DAYS  Gynecological physical examination significant for severe intermittent vaginal pain and possible STI exposure  Patient to be started on a progesterone only trial of Micronor after Cardiology approval on Tuesday  Cultures and pelvic ultrasound were ordered to rule out other gynecologic pathology  Self-breast examination stressed  Mammogram ordered  Discussed regular exercise, healthy diet, importance of vitamin D and calcium supplements  Discussed importance of sun block use during periods of prolonged sun exposure  Patient will be seen in 1 year for routine gynecologic and medical examination  Patient will call office for any problems, concerns, or issues which may arise during the interim  Subjective:      Patient ID: Adriana Lundy is a 53 y o  female  Patient is a 78-year-old female who presents today for her annual gynecologic and medical examination  Patient reports intermittent severe sharp vaginal pain for the past 2-4 months  She describes as an "ice pick" through her vagina  Patient has a history of polycystic ovaries, fibroids, and endometriosis but reports that this pain is different  Patient states that nothing makes the pain better or worse  Patient reports that her periods are regular and occur every 28 days for duration of 4 5-5 days  Patient notes that her 1st 2-3 days comprise of heavy blood loss and o'clock  Patient notes associated abdominal cramping, breast swelling, and headaches on the 1st few days which is normal for her  Patient's last menstrual period was on June 26  Patient reports that she recently had a new sexual partner and developed vaginal discharge and at perineal lesion and 1 week after her sexual encounter  Patient denies knowledge of her partner having an STI  Patient describes the vaginal discharge as screening and foul-smelling  Patient describes her perineal lesion as pruritic before it erupted into a lesion resembling a skin tag  Patient notes that it was painful afterwards and is now asymptomatic  Patient also reports increased weight gain and fatigue and is currently being followed by her PCP  Patient is due for blood work which include her thyroid levels  Impression is a gynecologic examination significant for severe intermittent vaginal pain and possible STI exposure  Patient to be started on a progesterone only trial of Micronor after Cardiology approval on Tuesday  Cultures and pelvic ultrasound were ordered to rule out other gynecologic pathology  The following portions of the patient's history were reviewed and updated as appropriate: allergies, current medications, past family history, past medical history, past social history, past surgical history and problem list     Review of Systems   Constitutional: Negative  Negative for appetite change, diaphoresis, fatigue, fever and unexpected weight change  HENT: Negative  Eyes: Negative  Respiratory: Negative  Cardiovascular: Negative  Gastrointestinal: Negative  Negative for abdominal pain, blood in stool, constipation, diarrhea, nausea and vomiting  Endocrine: Negative  Negative for cold intolerance and heat intolerance  Genitourinary: Negative  Negative for dysuria, frequency, hematuria, urgency, vaginal bleeding, vaginal discharge and vaginal pain  Musculoskeletal: Negative  Skin: Negative  Allergic/Immunologic: Negative  Neurological: Negative  Hematological: Negative  Negative for adenopathy  Psychiatric/Behavioral: Negative  Objective:      /84   Temp 99 6 °F (37 6 °C)   Ht 5' 8" (1 727 m)   Wt 111 kg (244 lb)   LMP 06/26/2020 (Exact Date)   BMI 37 10 kg/m²          Physical Exam   Constitutional: She is oriented to person, place, and time  She appears well-developed  No distress  HENT:   Head: Normocephalic and atraumatic  Eyes: Pupils are equal, round, and reactive to light  EOM are normal    Neck: Normal range of motion  Neck supple  Cardiovascular: Normal rate, regular rhythm and normal heart sounds  Exam reveals no gallop and no friction rub  No murmur heard  Pulmonary/Chest: Effort normal and breath sounds normal  Right breast exhibits no inverted nipple, no mass, no nipple discharge, no skin change and no tenderness  Left breast exhibits no inverted nipple, no mass, no nipple discharge, no skin change and no tenderness  Breasts are symmetrical    Abdominal: Soft  Normal appearance and bowel sounds are normal    Genitourinary: Rectum normal, vagina normal and uterus normal  Rectal exam shows guaiac negative stool  Pelvic exam was performed with patient supine  There is no rash or lesion on the right labia  There is no rash or lesion on the left labia  Uterus is not enlarged and not tender  Cervix exhibits no discharge and no friability  Right adnexum displays no mass, no tenderness and no fullness  Left adnexum displays no mass, no tenderness and no fullness  No erythema, tenderness or bleeding in the vagina  No vaginal discharge found  Genitourinary Comments: Naibothian cyst at 6 o'clock  Cultures obtained  Good pelvic support  Mild atrophic changes  Musculoskeletal: Normal range of motion  She exhibits no edema  Lymphadenopathy:     She has no cervical adenopathy  She has no axillary adenopathy          Right: No inguinal and no supraclavicular adenopathy present  Left: No inguinal and no supraclavicular adenopathy present  Neurological: She is alert and oriented to person, place, and time  Skin: Skin is warm, dry and intact  No rash noted  She is not diaphoretic  Psychiatric: She has a normal mood and affect   Her speech is normal and behavior is normal  Judgment and thought content normal  Cognition and memory are normal

## 2020-07-21 NOTE — PATIENT INSTRUCTIONS
Gynecological physical examination significant for severe intermittent vaginal pain and possible STI exposure  Patient to be started on a progesterone only trial of Micronor after Cardiology approval on Tuesday  Cultures and pelvic ultrasound were ordered to rule out other gynecologic pathology  Self-breast examination stressed  Mammogram ordered  Discussed regular exercise, healthy diet, importance of vitamin D and calcium supplements  Discussed importance of sun block use during periods of prolonged sun exposure  Patient will be seen in 1 year for routine gynecologic and medical examination  Patient will call office for any problems, concerns, or issues which may arise during the interim

## 2020-07-25 LAB
C TRACH DNA SPEC QL NAA+PROBE: NEGATIVE
N GONORRHOEA DNA SPEC QL NAA+PROBE: NEGATIVE

## 2020-07-26 LAB
BACTERIA GENITAL AEROBE CULT: ABNORMAL
BACTERIA GENITAL AEROBE CULT: ABNORMAL

## 2020-07-29 DIAGNOSIS — B95.5 BETA HEMOLYTIC STREPTOCOCCUS CULTURE POSITIVE: Primary | ICD-10-CM

## 2020-07-29 LAB
LAB AP GYN PRIMARY INTERPRETATION: NORMAL
Lab: NORMAL

## 2020-07-29 NOTE — TELEPHONE ENCOUNTER
----- Message from Sita eDmarco MD sent at 7/28/2020  1:06 PM EDT -----  Vaginal culture only positive for beta strep    Will treat with  Ampicillin  500 mg  #14  1 Tab twice daily for 7 days  Refill x 1    Check to make sure she can take Penicillin products with her allergy history    Thanks

## 2020-07-29 NOTE — TELEPHONE ENCOUNTER
----- Message from Danny Marr MD sent at 7/28/2020  1:06 PM EDT -----  Vaginal culture only positive for beta strep    Will treat with  Ampicillin  500 mg  #14  1 Tab twice daily for 7 days  Refill x 1    Check to make sure she can take Penicillin products with her allergy history    Thanks

## 2020-07-29 NOTE — TELEPHONE ENCOUNTER
----- Message from Ira Chowdhury MD sent at 7/28/2020  1:06 PM EDT -----  Vaginal culture only positive for beta strep    Will treat with  Ampicillin  500 mg  #14  1 Tab twice daily for 7 days  Refill x 1    Check to make sure she can take Penicillin products with her allergy history    Thanks

## 2020-07-30 RX ORDER — AMPICILLIN 500 MG/1
500 CAPSULE ORAL 2 TIMES DAILY
Qty: 14 CAPSULE | Refills: 0 | Status: SHIPPED | OUTPATIENT
Start: 2020-07-30 | End: 2020-08-03

## 2020-08-06 ENCOUNTER — ULTRASOUND (OUTPATIENT)
Dept: OBGYN CLINIC | Facility: CLINIC | Age: 45
End: 2020-08-06
Payer: MEDICARE

## 2020-08-06 DIAGNOSIS — B37.9 YEAST INFECTION: Primary | ICD-10-CM

## 2020-08-06 DIAGNOSIS — R10.2 PELVIC PAIN: Primary | ICD-10-CM

## 2020-08-06 PROCEDURE — 76856 US EXAM PELVIC COMPLETE: CPT | Performed by: OBSTETRICS & GYNECOLOGY

## 2020-08-06 RX ORDER — FLUCONAZOLE 150 MG/1
150 TABLET ORAL ONCE
Qty: 1 TABLET | Refills: 2 | Status: SHIPPED | OUTPATIENT
Start: 2020-08-06 | End: 2020-08-06

## 2020-08-06 NOTE — PROGRESS NOTES
AMB US Pelvic Non OB    Date/Time: 8/6/2020 1:33 PM  Performed by: Charlette Ramirez  Authorized by: Herman Cushing, MD     Procedure details:     Indications: non-obstetric abdominal pain      Technique:  US Pelvic, Non-OB with complete exam  Uterine findings:     Length (cm): 8 6    Height (cm):  6 4    Width (cm):  6 2    Uterine adhesions: not identified      Adnexal mass: identified      Location:  Mass right    Polyps: not identified      Myomas: identified      Endometrial stripe: identified      Endometrial hyperplasia: not identified      Endometrium thickness (mm):  10  Left ovary findings:     Left ovary:  Visualized    Cysts: not identified      Length (cm): 2 7    Height (cm): 2 4    Width (cm): 1 6  Right ovary findings:     Right ovary:  Visualized    Cysts: identified      Length (cm): 4 1    Height (cm): 3 3    Width (cm): 3 5  Other findings:     Free pelvic fluid: not identified      Free peritoneal fluid: not identified    Post-Procedure Details:     Impression:  RT Cyst=29 x 30 x 31 mm  Fibroids-ANT=27 x 26 x 24 mm,POST=27 x 26 x 25 mm    Tolerance:   Tolerated well, no immediate complications

## 2020-08-11 NOTE — PROGRESS NOTES
Pelvic ultrasound showed right ovarian cyst    Will repeat study in 4-6 weeks to check for resolution    Thanks

## 2020-08-18 ENCOUNTER — TELEPHONE (OUTPATIENT)
Dept: OBGYN CLINIC | Facility: CLINIC | Age: 45
End: 2020-08-18

## 2020-09-24 ENCOUNTER — OFFICE VISIT (OUTPATIENT)
Dept: OBGYN CLINIC | Facility: CLINIC | Age: 45
End: 2020-09-24
Payer: MEDICARE

## 2020-09-24 ENCOUNTER — ULTRASOUND (OUTPATIENT)
Dept: OBGYN CLINIC | Facility: CLINIC | Age: 45
End: 2020-09-24
Payer: MEDICARE

## 2020-09-24 VITALS
BODY MASS INDEX: 36.98 KG/M2 | WEIGHT: 244 LBS | TEMPERATURE: 98 F | HEIGHT: 68 IN | DIASTOLIC BLOOD PRESSURE: 78 MMHG | SYSTOLIC BLOOD PRESSURE: 122 MMHG

## 2020-09-24 DIAGNOSIS — Z71.9 ENCOUNTER FOR CONSULTATION: ICD-10-CM

## 2020-09-24 DIAGNOSIS — N83.209 CYST OF OVARY, UNSPECIFIED LATERALITY: ICD-10-CM

## 2020-09-24 DIAGNOSIS — R10.2 PELVIC PAIN: Primary | ICD-10-CM

## 2020-09-24 DIAGNOSIS — N76.0 ACUTE VAGINITIS: Primary | ICD-10-CM

## 2020-09-24 DIAGNOSIS — R39.9 LOWER URINARY TRACT SYMPTOMS (LUTS): ICD-10-CM

## 2020-09-24 LAB
SL AMB  POCT GLUCOSE, UA: ABNORMAL
SL AMB LEUKOCYTE ESTERASE,UA: ABNORMAL
SL AMB POCT BILIRUBIN,UA: ABNORMAL
SL AMB POCT BLOOD,UA: ABNORMAL
SL AMB POCT CLARITY,UA: ABNORMAL
SL AMB POCT COLOR,UA: YELLOW
SL AMB POCT KETONES,UA: ABNORMAL
SL AMB POCT NITRITE,UA: ABNORMAL
SL AMB POCT PH,UA: 6
SL AMB POCT SPECIFIC GRAVITY,UA: 1.01
SL AMB POCT URINE PROTEIN: 15
SL AMB POCT UROBILINOGEN: 0.2

## 2020-09-24 PROCEDURE — 87077 CULTURE AEROBIC IDENTIFY: CPT | Performed by: OBSTETRICS & GYNECOLOGY

## 2020-09-24 PROCEDURE — 76856 US EXAM PELVIC COMPLETE: CPT | Performed by: OBSTETRICS & GYNECOLOGY

## 2020-09-24 PROCEDURE — 87186 SC STD MICRODIL/AGAR DIL: CPT | Performed by: OBSTETRICS & GYNECOLOGY

## 2020-09-24 PROCEDURE — 99214 OFFICE O/P EST MOD 30 MIN: CPT | Performed by: OBSTETRICS & GYNECOLOGY

## 2020-09-24 PROCEDURE — 81002 URINALYSIS NONAUTO W/O SCOPE: CPT | Performed by: OBSTETRICS & GYNECOLOGY

## 2020-09-24 PROCEDURE — 87070 CULTURE OTHR SPECIMN AEROBIC: CPT | Performed by: OBSTETRICS & GYNECOLOGY

## 2020-09-24 PROCEDURE — 87086 URINE CULTURE/COLONY COUNT: CPT | Performed by: OBSTETRICS & GYNECOLOGY

## 2020-09-24 RX ORDER — MELOXICAM 15 MG/1
15 TABLET ORAL DAILY
COMMUNITY
Start: 2020-08-29

## 2020-09-24 RX ORDER — SPIRONOLACTONE 50 MG/1
50 TABLET, FILM COATED ORAL DAILY
COMMUNITY
Start: 2020-07-15 | End: 2021-02-04

## 2020-09-24 NOTE — PROGRESS NOTES
Assessment/Plan:    No problem-specific Assessment & Plan notes found for this encounter  Diagnoses and all orders for this visit:    Acute vaginitis  -     Genital Comprehensive Culture    Lower urinary tract symptoms (LUTS)  -     Urine culture  -     POCT urine dip    Encounter for consultation  -     Ambulatory referral to Infertility; Future    Other orders  -     spironolactone (ALDACTONE) 50 mg tablet; Take 50 mg by mouth daily  -     meloxicam (MOBIC) 15 mg tablet; Take 15 mg by mouth daily      Urine dip negative for leukocytes and nitrites, but positive for blood  With history of right flank pain and previous kidney stones, a kidney stone currently is possible  Urine culture and genital culture pending  Will call patient with results  Subjective:      Patient ID: Pipe Yanez is a 53 y o  female  Pamela Fuentes is a 53year old female presenting for a follow up for a right-sided ovarian cyst   Follow-up ultrasound performed today showed no cyst   For the past 3 weeks the patient has been experiencing some vaginal discharge  She describes a vaginal discharge is a yellow color and is associated with vaginal itching  The patient also complains of dysuria, increased urinary frequency, and urgency  Last weekend, the patient experienced some right-sided flank pain, which was relieved with increased fluid intake  The patient does have a history of kidney stones  She denies hematuria, vaginal bleeding, nausea, vomiting, diarrhea, pelvic pain, or fevers or chills  Patient reports that her periods are regular, occurring every 28 days  Her last menstrual period was 2 weeks ago  The patient is sexually active  She reports infrequent sexual intercourse, last occurring approximately 1 month ago  She denies any condom use  Total time of today's visit was 25 minutes of which greater than 50% was spent face-to-face counseling the patient as well as coordination of care            The following portions of the patient's history were reviewed and updated as appropriate: allergies, current medications, past family history, past medical history, past social history, past surgical history and problem list     Review of Systems   Constitutional: Negative for chills and fever  Respiratory: Negative for cough and shortness of breath  Cardiovascular: Negative for chest pain  Gastrointestinal: Negative for abdominal pain, constipation, diarrhea, nausea and vomiting  Genitourinary: Positive for dysuria, frequency, urgency and vaginal discharge  Negative for flank pain, hematuria, pelvic pain, vaginal bleeding and vaginal pain  Skin: Negative for rash  Neurological: Negative for light-headedness and headaches  Objective:      /78   Temp 98 °F (36 7 °C)   Ht 5' 8" (1 727 m)   Wt 111 kg (244 lb)   BMI 37 10 kg/m²          Physical Exam  Constitutional:       General: She is not in acute distress  Appearance: Normal appearance  She is well-developed  She is not diaphoretic  HENT:      Head: Normocephalic and atraumatic  Eyes:      Pupils: Pupils are equal, round, and reactive to light  Neck:      Musculoskeletal: Normal range of motion and neck supple  Cardiovascular:      Rate and Rhythm: Normal rate and regular rhythm  Heart sounds: Normal heart sounds  No murmur  No friction rub  No gallop  Pulmonary:      Effort: Pulmonary effort is normal       Breath sounds: Normal breath sounds  Chest:      Breasts: Breasts are symmetrical          Right: No inverted nipple, mass, nipple discharge, skin change or tenderness  Left: No inverted nipple, mass, nipple discharge, skin change or tenderness  Abdominal:      General: Bowel sounds are normal       Palpations: Abdomen is soft  Genitourinary:     General: Normal vulva  Exam position: Supine  Labia:         Right: No rash or lesion  Left: No rash or lesion         Vagina: Normal  No vaginal discharge, erythema, tenderness or bleeding  Cervix: No discharge or friability  Uterus: Not enlarged and not tender  Adnexa:         Right: No mass, tenderness or fullness  Left: No mass, tenderness or fullness  Rectum: Normal  Guaiac result negative  Comments: Pelvic exam reveals minimal vaginal discharge  No cervical motion tenderness  Musculoskeletal: Normal range of motion  Lymphadenopathy:      Cervical: No cervical adenopathy  Upper Body:      Right upper body: No supraclavicular adenopathy  Left upper body: No supraclavicular adenopathy  Skin:     General: Skin is warm and dry  Findings: No rash  Neurological:      Mental Status: She is alert and oriented to person, place, and time  Psychiatric:         Speech: Speech normal          Behavior: Behavior normal          Thought Content:  Thought content normal          Judgment: Judgment normal

## 2020-09-24 NOTE — PATIENT INSTRUCTIONS
Urine dip negative for leukocytes and nitrites, but positive for blood  With history of right flank pain and previous kidney stones, a kidney stone currently is possible  Urine culture and genital culture pending  Will call patient with results

## 2020-09-24 NOTE — PROGRESS NOTES
AMB US Pelvic Non OB    Date/Time: 9/24/2020 12:44 PM  Performed by: Tucker Rivera  Authorized by: Anjali Fernandez MD     Procedure details:     Indications: leiomyoma, ovarian cysts and non-obstetric abdominal pain      Technique:  US Pelvic, Non-OB with complete exam  Uterine findings:     Length (cm): 8 7    Height (cm):  6 8    Width (cm):  5 6    Uterine adhesions: not identified      Adnexal mass: not identified      Polyps: not identified      Myomas: identified      Endometrial stripe: identified      Endometrial hyperplasia: not identified      Endometrium thickness (mm):  10  Left ovary findings:     Left ovary:  Visualized    Cysts: not identified      Length (cm): 3 1    Height (cm): 2 9    Width (cm): 3  Right ovary findings:     Right ovary:  Visualized    Cysts: not identified      Length (cm): 2 7    Height (cm): 2 7    Width (cm): 2 9  Other findings:     Free pelvic fluid: not identified      Free peritoneal fluid: not identified    Post-Procedure Details:     Impression:  Endo=10mm, Ant Fibroid,37 x 35 x 33 mm,$ 21 x 22 x 20 mm    Tolerance:   Tolerated well, no immediate complications

## 2020-09-26 LAB — BACTERIA UR CULT: NORMAL

## 2020-09-27 LAB
BACTERIA GENITAL AEROBE CULT: ABNORMAL

## 2020-09-28 DIAGNOSIS — B96.89 BACTERIAL VAGINOSIS: Primary | ICD-10-CM

## 2020-09-28 DIAGNOSIS — N76.0 BACTERIAL VAGINOSIS: Primary | ICD-10-CM

## 2020-09-28 RX ORDER — AMPICILLIN 500 MG/1
500 CAPSULE ORAL 2 TIMES DAILY
Qty: 10 CAPSULE | Refills: 1 | Status: SHIPPED | OUTPATIENT
Start: 2020-09-28 | End: 2020-10-03

## 2020-09-28 NOTE — TELEPHONE ENCOUNTER
----- Message from Jerry London MD sent at 9/28/2020  9:02 AM EDT -----  Cultures showed mild bacterial overgrowth    Will treat with Cipro  500 mg   #14  1 tablet to be taken twice daily for 7 days  Refill x 1    Thanks

## 2020-09-28 NOTE — TELEPHONE ENCOUNTER
Patient aware of results, patient has allergy to cipro, Dr Carmine Hardin changed script to amoxicillin  Patient informed script will be sent to pharm

## 2020-10-05 PROBLEM — K52.9 COLITIS: Status: ACTIVE | Noted: 2020-10-05

## 2020-10-05 PROBLEM — Z90.49 HISTORY OF CHOLECYSTECTOMY: Status: ACTIVE | Noted: 2020-10-05

## 2020-10-05 PROBLEM — D50.0 IRON DEFICIENCY ANEMIA SECONDARY TO BLOOD LOSS (CHRONIC): Status: ACTIVE | Noted: 2020-10-05

## 2020-11-04 NOTE — PROGRESS NOTES
Ultrasound findings were completely stable    Two small fibroids remains stable as well    Patient may return for routine follow-up as planned or needed    Thanks

## 2021-01-22 ENCOUNTER — OFFICE VISIT (OUTPATIENT)
Dept: OBGYN CLINIC | Facility: CLINIC | Age: 46
End: 2021-01-22
Payer: MEDICARE

## 2021-01-22 VITALS
SYSTOLIC BLOOD PRESSURE: 124 MMHG | WEIGHT: 239.8 LBS | BODY MASS INDEX: 36.34 KG/M2 | DIASTOLIC BLOOD PRESSURE: 86 MMHG | HEIGHT: 68 IN

## 2021-01-22 DIAGNOSIS — N93.9 ABNORMAL UTERINE BLEEDING (AUB): ICD-10-CM

## 2021-01-22 DIAGNOSIS — N76.0 ACUTE VAGINITIS: Primary | ICD-10-CM

## 2021-01-22 DIAGNOSIS — N92.6 IRREGULAR MENSES: ICD-10-CM

## 2021-01-22 PROCEDURE — 87070 CULTURE OTHR SPECIMN AEROBIC: CPT | Performed by: PHYSICIAN ASSISTANT

## 2021-01-22 PROCEDURE — 87147 CULTURE TYPE IMMUNOLOGIC: CPT | Performed by: PHYSICIAN ASSISTANT

## 2021-01-22 PROCEDURE — 87186 SC STD MICRODIL/AGAR DIL: CPT | Performed by: PHYSICIAN ASSISTANT

## 2021-01-22 PROCEDURE — 99214 OFFICE O/P EST MOD 30 MIN: CPT | Performed by: PHYSICIAN ASSISTANT

## 2021-01-22 PROCEDURE — 87077 CULTURE AEROBIC IDENTIFY: CPT | Performed by: PHYSICIAN ASSISTANT

## 2021-01-22 NOTE — PROGRESS NOTES
Assessment/Plan:    No problem-specific Assessment & Plan notes found for this encounter  Diagnoses and all orders for this visit:    Acute vaginitis  -     Genital Comprehensive Culture    Abnormal uterine bleeding (AUB)  -     US pelvis complete w transvaginal; Future    Irregular menses  -     TSH, 3rd generation; Future  -     T4, free; Future        Vaginal cultures done  We will call with results and treat as necessary  Discussed periods  Pattern is not uncommon in perimenopausal patients  Will check thyroid labs; orders entered  Order entered for pelvic U/S  F/u to depend on results  If irregular bleeding continues, can discuss treatment  Call if symptoms worsen or change  Subjective:      Patient ID: Artie Smith is a 50 y o  female  Patient is here with multiple complaints  States she had vaginal itching, burning, and discharge for the last two weeks  Tried OTC Monistat vaginal cream for a week, but it did not help  Patient states she had burning with urination in the beginning, but it has since resolved  She denies further urinary symptoms, pelvic pain, bloating, abdominal pain, n/v, and fever/chills  No new sexual partners  Patient is also complaining of worsening periods  Cycles are regular every 28 days, but bleeding is now lasting for 2 weeks  Flow is moderate to heavy with moderate to severe cramping  This has occurred for her last two periods  Pelvic U/S in September showed fibroids, but no ovarian cysts  Patient think she may have had a ruptured cyst in December  LMP was 1/8; just finished today  Has not had thyroid checked recently        The following portions of the patient's history were reviewed and updated as appropriate: allergies, current medications, past family history, past medical history, past social history, past surgical history and problem list     Review of Systems   Constitutional: Negative for appetite change, chills, fever and unexpected weight change  Gastrointestinal: Negative for abdominal distention, abdominal pain, nausea and vomiting  Genitourinary: Positive for menstrual problem (Prolonged menses; dysmenorrhea) and vaginal discharge (With itching and burning)  Negative for difficulty urinating, dysuria, frequency, genital sores, hematuria, pelvic pain, urgency, vaginal bleeding and vaginal pain  Objective:      /86   Ht 5' 8" (1 727 m)   Wt 109 kg (239 lb 12 8 oz)   LMP 01/08/2021   BMI 36 46 kg/m²          Physical Exam  Vitals signs reviewed  Exam conducted with a chaperone present  Constitutional:       Appearance: Normal appearance  She is well-developed  Genitourinary:     General: Normal vulva  Pubic Area: No rash  Labia:         Right: No rash, tenderness, lesion or injury  Left: No rash, tenderness, lesion or injury  Vagina: Normal  No vaginal discharge, erythema, tenderness or bleeding  Cervix: Normal       Uterus: Enlarged  Adnexa: Right adnexa normal and left adnexa normal         Right: No mass, tenderness or fullness  Left: No mass, tenderness or fullness  Lymphadenopathy:      Lower Body: No right inguinal adenopathy  No left inguinal adenopathy  Skin:     General: Skin is warm and dry  Neurological:      Mental Status: She is alert and oriented to person, place, and time  Psychiatric:         Mood and Affect: Mood normal          Behavior: Behavior normal  Behavior is cooperative  Thought Content:  Thought content normal          Judgment: Judgment normal

## 2021-01-25 LAB
BACTERIA GENITAL AEROBE CULT: ABNORMAL

## 2021-01-26 ENCOUNTER — TELEPHONE (OUTPATIENT)
Dept: OBGYN CLINIC | Facility: CLINIC | Age: 46
End: 2021-01-26

## 2021-01-26 DIAGNOSIS — N76.0 VAGINAL INFECTION: Primary | ICD-10-CM

## 2021-01-26 RX ORDER — AMOXICILLIN AND CLAVULANATE POTASSIUM 875; 125 MG/1; MG/1
1 TABLET, FILM COATED ORAL EVERY 12 HOURS SCHEDULED
Qty: 10 TABLET | Refills: 0 | Status: SHIPPED | OUTPATIENT
Start: 2021-01-26 | End: 2021-01-31

## 2021-01-26 NOTE — TELEPHONE ENCOUNTER
Spoke with patient regarding vaginal culture result - positive for E coli and GBS  Had E coli on culture at the end of September and was treated with ampicillin due to abx allergies  E coli is resistant to ampicillin  Will treat with Augmentin  Patient states she has done well with this in the past   Had hives and elevated liver values last year while on Augmentin, but unsure if medication was the cause  Rx for Augmentin 875 BID x 5 days sent to pharmacy  Patient to stop medication immediately if reaction occurs  Will reculture after abx course  Patient to schedule appointment today  Call with problems

## 2021-02-04 ENCOUNTER — ULTRASOUND (OUTPATIENT)
Dept: OBGYN CLINIC | Facility: CLINIC | Age: 46
End: 2021-02-04
Payer: MEDICARE

## 2021-02-04 ENCOUNTER — OFFICE VISIT (OUTPATIENT)
Dept: OBGYN CLINIC | Facility: CLINIC | Age: 46
End: 2021-02-04
Payer: MEDICARE

## 2021-02-04 VITALS
DIASTOLIC BLOOD PRESSURE: 94 MMHG | BODY MASS INDEX: 37.1 KG/M2 | SYSTOLIC BLOOD PRESSURE: 132 MMHG | WEIGHT: 244.8 LBS | HEIGHT: 68 IN

## 2021-02-04 DIAGNOSIS — L29.2 VULVOVAGINAL ITCHING: Primary | ICD-10-CM

## 2021-02-04 DIAGNOSIS — N92.6 IRREGULAR MENSES: ICD-10-CM

## 2021-02-04 DIAGNOSIS — N93.9 ABNORMAL UTERINE BLEEDING (AUB): Primary | ICD-10-CM

## 2021-02-04 DIAGNOSIS — N89.8 VAGINAL DISCHARGE: ICD-10-CM

## 2021-02-04 PROCEDURE — 99214 OFFICE O/P EST MOD 30 MIN: CPT | Performed by: PHYSICIAN ASSISTANT

## 2021-02-04 PROCEDURE — 87077 CULTURE AEROBIC IDENTIFY: CPT | Performed by: PHYSICIAN ASSISTANT

## 2021-02-04 PROCEDURE — 76856 US EXAM PELVIC COMPLETE: CPT | Performed by: OBSTETRICS & GYNECOLOGY

## 2021-02-04 PROCEDURE — 87147 CULTURE TYPE IMMUNOLOGIC: CPT | Performed by: PHYSICIAN ASSISTANT

## 2021-02-04 PROCEDURE — 87070 CULTURE OTHR SPECIMN AEROBIC: CPT | Performed by: PHYSICIAN ASSISTANT

## 2021-02-04 PROCEDURE — 87186 SC STD MICRODIL/AGAR DIL: CPT | Performed by: PHYSICIAN ASSISTANT

## 2021-02-04 NOTE — PROGRESS NOTES
AMB US Pelvic Non OB    Date/Time: 2/4/2021 1:16 PM  Performed by: Case Frederick  Authorized by: Cadence Torres MD     Procedure details:     Indications: non-obstetric vaginal bleeding and intermenstrual blood loss      Technique:  US Pelvic, Non-OB with complete exam  Uterine findings:     Length (cm): 9    Height (cm):  6 3    Width (cm):  5 2    Uterine adhesions: not identified      Adnexal mass: not identified      Polyps: not identified      Myomas: not identified      Endometrial stripe: identified      Endometrial hyperplasia: not identified      Endometrium thickness (mm):  11  Left ovary findings:     Left ovary:  Visualized    Cysts: not identified      Length (cm): 3    Height (cm): 2 6    Width (cm): 2 3  Right ovary findings:     Right ovary:  Visualized    Cysts: not identified      Length (cm): 2 8    Height (cm): 2 9    Width (cm): 1 9  Other findings:     Free pelvic fluid: not identified      Free peritoneal fluid: not identified    Post-Procedure Details:     Impression:  ENDO=11MM, wnl    Tolerance:   Tolerated well, no immediate complications

## 2021-02-04 NOTE — PROGRESS NOTES
Assessment/Plan:    No problem-specific Assessment & Plan notes found for this encounter  Diagnoses and all orders for this visit:    Vulvovaginal itching  -     Genital Comprehensive Culture  -     miconazole (MONISTAT-7) 2 % vaginal cream; Insert 1 applicator into the vagina daily at bedtime for 7 days    Vaginal discharge  -     Genital Comprehensive Culture  -     miconazole (MONISTAT-7) 2 % vaginal cream; Insert 1 applicator into the vagina daily at bedtime for 7 days    Irregular menses        Repeat vaginal culture done  We will call with results  Counseled patient she could have yeast infection after abx treatment  Recommended OTC Monistat 7 day vaginal cream   If symptoms fail to resolve and culture is negative, may need to proceed with vulvar biopsy for possible lichen sclerosus  Pelvic U/S today showed normal ovaries, and an endometrium of 11 mm  Patient to go for thyroid labs  Per Dr Jemma Clinton, will continue to monitor for next 1-2 months  Patient asked about a week of progesterone; explained that it may suppress bleeding for a week, but withdrawal bleed will occur after progesterone is stopped  If bleeding continues, will perform endometrial biopsy  Can use Megace for suppression if needed  F/u to depend on results  Call if symptoms worsen or change  Subjective:      Patient ID: Myriam Rolle is a 50 y o  female  Patient is here for f/u vaginal culture  Was diagnosed with E coli vaginal infection in September and treated with ampicillin, which E coli was resistant to  Continued to have symptoms and was cultured again 1/22  E coli still present; treated with Augmentin  Patient states she is still having discharge, vaginal itching, and urinary urgency  Tried OTC treatment prior to visit on 1/22, but nothing since  Denies further urinary symptoms, pelvic pain, abdominal pain, n/v, and fever/chills  Continues to have irregular menstrual bleeding    Most recent period started 1/8, and patient states she bled for almost 3 weeks  Noticed "flesh colored clots"  Had pelvic U/S today  Has not gone for thyroid labs  The following portions of the patient's history were reviewed and updated as appropriate: allergies, current medications, past family history, past medical history, past social history, past surgical history and problem list     Review of Systems   Constitutional: Negative for chills and fever  Gastrointestinal: Negative for abdominal distention, abdominal pain, nausea and vomiting  Genitourinary: Positive for menstrual problem (Irregular, heavy menses), urgency and vaginal discharge  Negative for difficulty urinating, dysuria, frequency, genital sores, hematuria, pelvic pain, vaginal bleeding and vaginal pain  Vulvovaginal itching           Objective:      /94   Ht 5' 8" (1 727 m)   Wt 111 kg (244 lb 12 8 oz)   LMP 01/08/2021   BMI 37 22 kg/m²          Physical Exam  Vitals signs reviewed  Exam conducted with a chaperone present  Constitutional:       Appearance: Normal appearance  She is well-developed  Genitourinary:     Pubic Area: No rash  Labia:         Right: Tenderness (With erythema) present  No rash, lesion or injury  Left: Tenderness (With erythema) present  No rash, lesion or injury  Vagina: Normal  No vaginal discharge, erythema, tenderness or bleeding  Cervix: Normal       Uterus: Normal        Adnexa: Right adnexa normal and left adnexa normal         Right: No mass, tenderness or fullness  Left: No mass, tenderness or fullness  Lymphadenopathy:      Lower Body: No right inguinal adenopathy  No left inguinal adenopathy  Skin:     General: Skin is warm and dry  Neurological:      Mental Status: She is alert and oriented to person, place, and time  Psychiatric:         Mood and Affect: Mood normal          Behavior: Behavior normal  Behavior is cooperative  Thought Content:  Thought content normal          Judgment: Judgment normal

## 2021-02-04 NOTE — PATIENT INSTRUCTIONS
Use OTC Monistat 7 day vaginal cream     Go for thyroid blood work  Call if symptoms worsen or change

## 2021-02-08 LAB
BACTERIA GENITAL AEROBE CULT: ABNORMAL
BACTERIA GENITAL AEROBE CULT: ABNORMAL

## 2021-02-17 ENCOUNTER — TELEPHONE (OUTPATIENT)
Dept: OBGYN CLINIC | Facility: CLINIC | Age: 46
End: 2021-02-17

## 2021-02-17 NOTE — TELEPHONE ENCOUNTER
Spoke with patient regarding culture results - no change after treatment with Augmentin  Per Dr Orville Dumont from infectious disease, E coli should not be causing vaginal symptoms  Patient still complaining of discharge and burning  May be hormonal change  Recommended f/u with [facility]  Office info sent through [facility]  Patient to call for appointment

## 2021-03-29 DIAGNOSIS — Z12.39 BREAST CANCER SCREENING: ICD-10-CM

## 2021-03-30 DIAGNOSIS — Z23 ENCOUNTER FOR IMMUNIZATION: ICD-10-CM

## 2021-04-20 NOTE — PROGRESS NOTES
Pelvic ultrasound results were within normal limits     Will see patient back in several months for follow-up     She will call for any problems, questions, issues or concerns which may arise during the interim

## 2021-04-21 PROBLEM — K21.9 GERD (GASTROESOPHAGEAL REFLUX DISEASE): Status: ACTIVE | Noted: 2021-04-21

## 2021-04-21 PROBLEM — Z98.84 H/O BARIATRIC SURGERY: Status: ACTIVE | Noted: 2021-04-21

## 2021-04-21 PROBLEM — K76.9 LIVER DISORDER: Status: ACTIVE | Noted: 2021-04-21

## 2021-10-04 PROBLEM — E66.01 MORBID OBESITY (HCC): Status: ACTIVE | Noted: 2021-10-04

## 2021-10-04 PROBLEM — Z90.3 H/O GASTRIC SLEEVE: Status: ACTIVE | Noted: 2021-10-04

## 2021-10-04 PROBLEM — R79.89 ABNORMAL LFTS: Status: ACTIVE | Noted: 2021-10-04

## 2021-12-09 ENCOUNTER — ANNUAL EXAM (OUTPATIENT)
Dept: OBGYN CLINIC | Facility: CLINIC | Age: 46
End: 2021-12-09
Payer: MEDICARE

## 2021-12-09 VITALS
BODY MASS INDEX: 23.04 KG/M2 | WEIGHT: 152 LBS | DIASTOLIC BLOOD PRESSURE: 64 MMHG | HEIGHT: 68 IN | SYSTOLIC BLOOD PRESSURE: 110 MMHG

## 2021-12-09 DIAGNOSIS — Z12.31 ENCOUNTER FOR SCREENING MAMMOGRAM FOR MALIGNANT NEOPLASM OF BREAST: Primary | ICD-10-CM

## 2021-12-09 DIAGNOSIS — R10.2 PELVIC PAIN: ICD-10-CM

## 2021-12-09 DIAGNOSIS — Z01.419 ENCOUNTER FOR GYNECOLOGICAL EXAMINATION WITHOUT ABNORMAL FINDING: ICD-10-CM

## 2021-12-09 PROCEDURE — G0101 CA SCREEN;PELVIC/BREAST EXAM: HCPCS | Performed by: OBSTETRICS & GYNECOLOGY

## 2021-12-09 RX ORDER — GABAPENTIN 100 MG/1
CAPSULE ORAL
COMMUNITY
Start: 2021-10-15

## 2021-12-09 RX ORDER — UBROGEPANT 50 MG/1
TABLET ORAL
COMMUNITY
Start: 2021-11-16

## 2021-12-09 RX ORDER — BUPROPION HYDROCHLORIDE 150 MG/1
TABLET ORAL
COMMUNITY
Start: 2021-12-02

## 2021-12-16 ENCOUNTER — ULTRASOUND (OUTPATIENT)
Dept: OBGYN CLINIC | Facility: CLINIC | Age: 46
End: 2021-12-16
Payer: MEDICARE

## 2021-12-16 DIAGNOSIS — D21.9 FIBROID: ICD-10-CM

## 2021-12-16 DIAGNOSIS — R10.2 PELVIC PAIN: ICD-10-CM

## 2021-12-16 PROCEDURE — 76856 US EXAM PELVIC COMPLETE: CPT | Performed by: OBSTETRICS & GYNECOLOGY

## 2021-12-28 ENCOUNTER — TELEPHONE (OUTPATIENT)
Dept: OBGYN CLINIC | Facility: CLINIC | Age: 46
End: 2021-12-28

## 2022-01-19 ENCOUNTER — HOSPITAL ENCOUNTER (OUTPATIENT)
Dept: RADIOLOGY | Facility: HOSPITAL | Age: 47
Discharge: HOME | End: 2022-01-19
Payer: MEDICARE

## 2022-01-19 DIAGNOSIS — R10.84 GENERALIZED ABDOMINAL PAIN: ICD-10-CM

## 2022-01-19 PROCEDURE — 74246 X-RAY XM UPR GI TRC 2CNTRST: CPT

## 2022-01-19 PROCEDURE — 74248 X-RAY SM INT F-THRU STD: CPT

## 2022-06-14 ENCOUNTER — OFFICE VISIT (OUTPATIENT)
Dept: OBGYN CLINIC | Facility: CLINIC | Age: 47
End: 2022-06-14

## 2022-06-14 VITALS
SYSTOLIC BLOOD PRESSURE: 106 MMHG | WEIGHT: 144 LBS | HEIGHT: 69 IN | BODY MASS INDEX: 21.33 KG/M2 | DIASTOLIC BLOOD PRESSURE: 76 MMHG

## 2022-06-14 DIAGNOSIS — N92.6 IRREGULAR MENSTRUAL BLEEDING: ICD-10-CM

## 2022-06-14 DIAGNOSIS — N89.8 VAGINAL DRYNESS: ICD-10-CM

## 2022-06-14 DIAGNOSIS — N95.1 PERIMENOPAUSAL VASOMOTOR SYMPTOMS: ICD-10-CM

## 2022-06-14 DIAGNOSIS — R10.2 PELVIC PAIN: Primary | ICD-10-CM

## 2022-06-14 DIAGNOSIS — R19.00 PELVIC MASS: ICD-10-CM

## 2022-06-14 PROCEDURE — 99214 OFFICE O/P EST MOD 30 MIN: CPT | Performed by: OBSTETRICS & GYNECOLOGY

## 2022-06-14 NOTE — PROGRESS NOTES
Assessment/Plan:    No problem-specific Assessment & Plan notes found for this encounter  Diagnoses and all orders for this visit:    Pelvic mass, pelvic pain, and irregular menstrual bleeding  - Pelvic ultrasound ordered  - Endometrial biopsy ordered  Perimenopausal vasomotor symptoms    Vaginal dryness  - Continue using estradiol (Estrace) cream topically in the vagina for symptomatic management  Subjective:      Patient ID: Dayan Lomas is a 48 y o  female  LATASHA Palacios is a 47-year-old female presenting for a 6-month follow-up of pelvic pain  She is coming in today with multiple complaints  She states she continues to experience bilateral lower abdominal/pelvic pain, although L>R  She describes the pain as an achy pressure, although sometimes it is sharp, stabbing, and excruciating  She experiences this pain a few times weekly  She takes meloxicam with some relief of symptoms  She has a history of fibroids found on ultrasound this past Dec  She also recently had an MRI performed for an unrelated reason, which revealed a left-sided pelvic mass  She also reports abnormal bleeding  She says her periods began becoming irregular around the time of her last visit in 12/2021  In April, she states that she bled consistently for 3 weeks with a heavy flow, using a pad per hour  The bleeding then resolved for 9 days and then began again  She notes inability to use tampons for the bleeding due to vaginal pain with insertion of a tampon  During this time, she noted occasional dizziness and lightheadedness  Ric any syncopal episodes  Her other complaints include a small lump in her inner R groin, which she follows with a dermatologist for and is seeing tomorrow for a Kenalog injection  She also notes significant vaginal dryness and states that the estradiol (Estrace) cream has not provided great relief of symptoms         Total time of today's visit was 25 minutes of which greater than 50% was spent face-to-face counseling the patient as well as coordination of care, review of chart laboratory values, physical examination as well as computer entry into the epic medical record system  The following portions of the patient's history were reviewed and updated as appropriate: allergies, current medications, past family history, past medical history, past social history, past surgical history and problem list     Review of Systems   Constitutional: Negative for chills, diaphoresis, fatigue and fever  Respiratory: Negative  Negative for chest tightness and shortness of breath  Cardiovascular: Negative  Negative for chest pain  Gastrointestinal: Negative for abdominal pain, constipation, diarrhea, nausea and vomiting  Genitourinary: Positive for menstrual problem (irregular bleeding), pelvic pain (bilateral, L > R) and vaginal pain  Negative for dyspareunia, dysuria, frequency, hematuria, urgency, vaginal bleeding and vaginal discharge  Musculoskeletal: Negative for arthralgias  Skin: Negative for color change and rash  Neurological: Positive for dizziness and light-headedness  Negative for syncope and headaches  Objective:      /76   Ht 5' 8 5" (1 74 m)   Wt 65 3 kg (144 lb)   LMP 05/10/2022   BMI 21 58 kg/m²          Physical Exam  Exam conducted with a chaperone present  Constitutional:       General: She is not in acute distress  Appearance: Normal appearance  She is well-developed  She is not diaphoretic  HENT:      Head: Normocephalic and atraumatic  Eyes:      Pupils: Pupils are equal, round, and reactive to light  Cardiovascular:      Rate and Rhythm: Normal rate and regular rhythm  Heart sounds: Normal heart sounds  No murmur heard  No friction rub  No gallop  Pulmonary:      Effort: Pulmonary effort is normal       Breath sounds: Normal breath sounds  No wheezing, rhonchi or rales  Abdominal:      Palpations: Abdomen is soft  Genitourinary:     Exam position: Supine  Labia:         Right: No rash, tenderness or lesion  Left: No rash, tenderness or lesion  Vagina: Normal  No vaginal discharge, erythema, tenderness or bleeding  Cervix: No discharge or friability  Uterus: Enlarged  Not tender  Adnexa:         Right: No mass, tenderness or fullness  Left: No mass, tenderness or fullness  Rectum: Normal  Guaiac result negative  Comments: Minimal atrophic change present on physical exam  Good pelvic support confirmed  Slightly enlarged anteverted fibroid uterus  Musculoskeletal:         General: Normal range of motion  Cervical back: Normal range of motion and neck supple  Lymphadenopathy:      Cervical: No cervical adenopathy  Skin:     General: Skin is warm and dry  Findings: No rash  Neurological:      General: No focal deficit present  Mental Status: She is alert and oriented to person, place, and time  Psychiatric:         Mood and Affect: Mood normal          Speech: Speech normal          Behavior: Behavior normal          Thought Content:  Thought content normal          Judgment: Judgment normal

## 2022-06-14 NOTE — PATIENT INSTRUCTIONS
Abnormal (Dysfunctional) Uterine Bleeding   WHAT YOU NEED TO KNOW:   Abnormal uterine bleeding (AUB) is uterine bleeding that is not usual for you  It may also be called dysfunctional uterine bleeding  You may have bleeding from your uterus at times other than your normal monthly period  Your monthly periods may last longer or shorter, and bleeding may be heavier or lighter than usual  AUB can be acute (lasting a short time) or chronic (lasting longer than 6 months)  DISCHARGE INSTRUCTIONS:   Return to the emergency department if:   You continue to bleed heavily, or you feel faint  Call your doctor or gynecologist if:   You need to change your sanitary pad or tampon more than 1 time each hour  Your medicine causes nausea, vomiting, or diarrhea  You have questions or concerns about your condition or care  Medicines: You may need any of the following:  Hormones  help decrease bleeding by making your monthly periods more regular  Sometimes this medicine may be given as birth control pills  Iron supplements  may be given if your blood iron level decreases because of heavy bleeding  Iron may make you constipated  Ask your healthcare provider for ways to prevent or treat constipation  Iron may also make your bowel movements turn dark or black  Take your medicine as directed  Contact your healthcare provider if you think your medicine is not helping or if you have side effects  Tell him or her if you are allergic to any medicine  Keep a list of the medicines, vitamins, and herbs you take  Include the amounts, and when and why you take them  Bring the list or the pill bottles to follow-up visits  Carry your medicine list with you in case of an emergency  Self-care:   Apply heat on your lower abdomen to decrease pain and muscle spasms  Apply heat for 20 to 30 minutes every 2 hours for as many days as directed  Include foods high in iron if needed    Examples of foods high in iron are leafy green vegetables, beef, pork, liver, eggs, and whole-grain breads and cereals  Keep a diary of your menstrual cycles  Keep track of the number of tampons or pads you use each day  Talk to your healthcare provider before you start a weight loss program   You may need to wait until the abnormal bleeding has stopped before you try to lose weight  The amount of iron in your blood should be normal before you lose weight  Ask your provider if weight loss will help your AUB  He or she can tell you what weight is healthy for you  He or she can help you create a safe weight loss plan, if needed  Follow up with your doctor or gynecologist as directed: You may need to return in 4 to 6 months so your provider knows if the AUB has stopped  Bring the diary of your menstrual cycles to your follow-up visits  Write down your questions so you remember to ask them during your visits  © [employer] 2022 Information is for End User's use only and may not be sold, redistributed or otherwise used for commercial purposes  All illustrations and images included in CareNotes® are the copyrighted property of [employer]  or Eliud Duarte   The above information is an  only  It is not intended as medical advice for individual conditions or treatments  Talk to your doctor, nurse or pharmacist before following any medical regimen to see if it is safe and effective for you

## 2022-07-21 ENCOUNTER — TELEPHONE (OUTPATIENT)
Dept: OBGYN CLINIC | Facility: CLINIC | Age: 47
End: 2022-07-21

## 2022-07-21 NOTE — TELEPHONE ENCOUNTER
Patient had pelvic ultrasound completed at [facility], the results are in the portal  She would like you to review since there were some abnormal findings on the report

## 2022-07-22 NOTE — TELEPHONE ENCOUNTER
Left cyst resolved    New Right cyst    Please have a follow up with Patsie Osler in 6 weeks with a brief visit with me    Thanks Patient is requesting refill for doxazosin (CARDURA) 2 MG tablet  Last refilled 12/22/21  Last OV 3/15/22      See note below.

## 2022-09-22 ENCOUNTER — ULTRASOUND (OUTPATIENT)
Dept: OBGYN CLINIC | Facility: CLINIC | Age: 47
End: 2022-09-22
Payer: MEDICARE

## 2022-09-22 ENCOUNTER — OFFICE VISIT (OUTPATIENT)
Dept: OBGYN CLINIC | Facility: CLINIC | Age: 47
End: 2022-09-22
Payer: MEDICARE

## 2022-09-22 VITALS
SYSTOLIC BLOOD PRESSURE: 120 MMHG | HEIGHT: 69 IN | BODY MASS INDEX: 20.44 KG/M2 | WEIGHT: 138 LBS | DIASTOLIC BLOOD PRESSURE: 80 MMHG

## 2022-09-22 DIAGNOSIS — N83.201 CYST OF RIGHT OVARY: Primary | ICD-10-CM

## 2022-09-22 DIAGNOSIS — Z87.42 HISTORY OF OVARIAN CYST: Primary | ICD-10-CM

## 2022-09-22 PROCEDURE — 76856 US EXAM PELVIC COMPLETE: CPT | Performed by: OBSTETRICS & GYNECOLOGY

## 2022-09-22 PROCEDURE — 99213 OFFICE O/P EST LOW 20 MIN: CPT | Performed by: OBSTETRICS & GYNECOLOGY

## 2022-09-22 NOTE — PROGRESS NOTES
AMB US Pelvic Non OB    Date/Time: 9/22/2022 2:23 PM  Performed by: Narinder Fajardo  Authorized by: Reynold Webster MD     Procedure details:     Indications: ovarian cysts and non-obstetric abdominal pain      Technique:  US Pelvic, Non-OB with complete exam  Uterine findings:     Length (cm): 8 8    Height (cm):  6 5    Width (cm):  5 5  Left ovary findings:     Left ovary:  Visualized    Cysts: not identified      Length (cm): 2 7    Height (cm): 2 2    Width (cm): 2  Right ovary findings:     Right ovary:  Visualized    Cysts: not identified      Length (cm): 2 5    Height (cm): 2 1    Width (cm): 1 6  Other findings:     Free pelvic fluid: not identified      Free peritoneal fluid: not identified    Post-Procedure Details:     Impression:  Endo-9mm  Fibroids-Post=31 x 30 x 31 mm, ANT=24 x 23 x 22 mm    Tolerance:   Tolerated well, no immediate complications

## 2022-09-23 NOTE — PROGRESS NOTES
Assessment/Plan:    No problem-specific Assessment & Plan notes found for this encounter  Diagnoses and all orders for this visit:    History of ovarian cyst        Subjective:      Patient ID: Mark Pickett is a 48 y o  female  Patient is a 57-year-old female who presents today for follow-up of a right ovarian cyst which was detected on ultrasound performed at [facility] during the summer of 2022  Patient at this time denies any erratic vaginal bleeding other than her menstrual cycles  They are generally on time and not excessive in flow and not accompanied by any significant pain or cramping  Patient reports normal appetite at this time     She also reports normal bowel and bladder habits     She denies any significant pelvic or abdominal pain as well  Due to the history of ovarian cysts, we will order a follow-up interval pelvic ultrasound to confirm stability of the pelvis particularly of both ovaries  We will see her back in several months for routine gynecologic follow-up     All questions were answered for her during today's visit     Patient to call for any problems, questions, issues or concerns which may arise for her  Total time of today's visit was 20 minutes of which greater than 50% was spent face-to-face counseling the patient as well as coordination of care, review of chart lab values, physical evaluation as well as computer entry into the epic medical record system  The following portions of the patient's history were reviewed and updated as appropriate: allergies, current medications, past family history, past medical history, past social history, past surgical history and problem list     Review of Systems   Constitutional: Negative  Negative for appetite change, diaphoresis, fatigue, fever and unexpected weight change  HENT: Negative  Eyes: Negative  Respiratory: Negative  Cardiovascular: Negative  Gastrointestinal: Negative  Negative for abdominal pain, blood in stool, constipation, diarrhea, nausea and vomiting  Endocrine: Negative  Negative for cold intolerance and heat intolerance  Genitourinary: Negative  Negative for dysuria, frequency, hematuria, urgency, vaginal bleeding, vaginal discharge and vaginal pain  Musculoskeletal: Negative  Skin: Negative  Allergic/Immunologic: Negative  Neurological: Negative  Hematological: Negative  Negative for adenopathy  Psychiatric/Behavioral: Negative  Objective:      /80   Ht 5' 8 5" (1 74 m)   Wt 62 6 kg (138 lb)   LMP 09/13/2022   BMI 20 68 kg/m²          Physical Exam  Constitutional:       Appearance: She is well-developed  HENT:      Head: Normocephalic  Eyes:      Pupils: Pupils are equal, round, and reactive to light  Cardiovascular:      Rate and Rhythm: Normal rate  Pulmonary:      Effort: Pulmonary effort is normal    Musculoskeletal:         General: Normal range of motion  Cervical back: Normal range of motion and neck supple  Skin:     General: Skin is warm and dry  Neurological:      General: No focal deficit present  Mental Status: She is alert and oriented to person, place, and time  Psychiatric:         Mood and Affect: Mood normal          Behavior: Behavior normal          Thought Content:  Thought content normal          Judgment: Judgment normal

## 2022-09-30 NOTE — PATIENT INSTRUCTIONS
Topic: History of ovarian cyst     Discussed recent history of ovarian cyst with the patient at today's visit     She has a long history of various pelvic symptomatology      Currently she is not complaining of any excessive bleeding or pelvic or abdominal pain     We will check an interval pelvic ultrasound to confirm stability    She will be seen in several months for routine gynecologic follow-up     Patient was told to call for any problems, questions, issues or concerns which may arise for her

## 2022-10-04 NOTE — PROGRESS NOTES
Reviewed results with the patient     Will see me back in December 2023 for follow-up gyn visit     Patient to call for any problems, questions, issues or concerns which may arise for her

## 2022-10-05 ENCOUNTER — OFFICE VISIT (OUTPATIENT)
Dept: OBGYN CLINIC | Facility: CLINIC | Age: 47
End: 2022-10-05

## 2022-10-05 VITALS
SYSTOLIC BLOOD PRESSURE: 120 MMHG | DIASTOLIC BLOOD PRESSURE: 80 MMHG | HEIGHT: 69 IN | WEIGHT: 140 LBS | BODY MASS INDEX: 20.73 KG/M2

## 2022-10-05 DIAGNOSIS — N63.22 MASS OF UPPER INNER QUADRANT OF LEFT BREAST: Primary | ICD-10-CM

## 2022-10-29 NOTE — PATIENT INSTRUCTIONS
Topic:  Fullness in the upper inner aspect of the left breast    Diagnostic breast ultrasound of the left breast was ordered    All questions answered for the patient at today's visit    She will be seen for scheduled follow-up in December    Patient to call for any problems, questions, issues or concerns which may arise during the interim

## 2022-11-16 ENCOUNTER — TELEPHONE (OUTPATIENT)
Dept: OBGYN CLINIC | Facility: CLINIC | Age: 47
End: 2022-11-16

## 2022-11-16 NOTE — TELEPHONE ENCOUNTER
Pt calling re: menses - prolonged duration this cycle & sev times earlier this yr - cycles usually q 26 days x 7 days  LMP 11/2/2022 - also had (+) COVID 11/3/2022  Had heavy bleeding 1st 10 days (changing overnight pad q 1 hr), then some decrease in flow but still changing 2-3x/day, br red bleeding   (+) fatigue, no lightheadedness or dizziness  Had hgb/hct = 11 6/35 3, WBC = 1 6

## 2022-11-22 NOTE — TELEPHONE ENCOUNTER
Bleeding pattern is most likely due to perimenopausal changes and hormonal fluctuation     Please encourage the patient to obtain her pelvic ultrasound so we may evaluate the uterine lining    We can then determine a treatment plan pending results    Thanks

## 2022-12-06 PROBLEM — R93.5 ABNORMAL MRI, PELVIS: Status: ACTIVE | Noted: 2022-12-06

## 2022-12-09 NOTE — TELEPHONE ENCOUNTER
Lm pt's as re: bleeding pattern prob related to perimenopause & recom to obtain pelvic U/S prev ordered

## 2022-12-13 ENCOUNTER — ANNUAL EXAM (OUTPATIENT)
Dept: OBGYN CLINIC | Facility: CLINIC | Age: 47
End: 2022-12-13

## 2022-12-13 VITALS
SYSTOLIC BLOOD PRESSURE: 120 MMHG | BODY MASS INDEX: 20.73 KG/M2 | DIASTOLIC BLOOD PRESSURE: 58 MMHG | WEIGHT: 140 LBS | HEIGHT: 69 IN

## 2022-12-13 DIAGNOSIS — Z12.31 ENCOUNTER FOR SCREENING MAMMOGRAM FOR MALIGNANT NEOPLASM OF BREAST: ICD-10-CM

## 2022-12-13 DIAGNOSIS — Z01.419 PAP SMEAR, AS PART OF ROUTINE GYNECOLOGICAL EXAMINATION: ICD-10-CM

## 2022-12-13 DIAGNOSIS — N89.8 VAGINAL DISCHARGE: ICD-10-CM

## 2022-12-13 DIAGNOSIS — Z01.419 WOMEN'S ANNUAL ROUTINE GYNECOLOGICAL EXAMINATION: Primary | ICD-10-CM

## 2022-12-13 RX ORDER — PSEUDOEPHEDRINE HCL 30 MG
100 TABLET ORAL 2 TIMES DAILY
COMMUNITY
Start: 2022-09-02

## 2022-12-13 RX ORDER — CYANOCOBALAMIN 1000 UG/ML
1000 INJECTION, SOLUTION INTRAMUSCULAR; SUBCUTANEOUS
COMMUNITY
Start: 2022-08-23

## 2022-12-13 RX ORDER — DOCUSATE SODIUM 100 MG/1
100 CAPSULE, LIQUID FILLED ORAL 2 TIMES DAILY
COMMUNITY
Start: 2022-09-28

## 2022-12-13 RX ORDER — CYANOCOBALAMIN 1000 UG/ML
INJECTION, SOLUTION INTRAMUSCULAR; SUBCUTANEOUS
COMMUNITY
Start: 2022-11-18

## 2022-12-13 RX ORDER — FERROUS SULFATE 325(65) MG
1 TABLET ORAL
COMMUNITY
Start: 2022-09-28

## 2022-12-13 NOTE — PROGRESS NOTES
Assessment/Plan:    No problem-specific Assessment & Plan notes found for this encounter  Diagnoses and all orders for this visit:    Women's annual routine gynecological examination  -     Liquid-based pap, screening    Pap smear, as part of routine gynecological examination    Encounter for screening mammogram for malignant neoplasm of breast  -     Mammo screening bilateral w 3d & cad; Future    Vaginal discharge  -     Genital Comprehensive Culture    Other orders  -     ferrous sulfate 325 (65 Fe) mg tablet; Take 1 tablet by mouth daily before breakfast  -     Docusate Sodium (DSS) 100 MG CAPS; Take 100 mg by mouth 2 (two) times a day  -     docusate sodium (COLACE) 100 mg capsule; Take 100 mg by mouth 2 (two) times a day  -     cyanocobalamin 1,000 mcg/mL; Inject 1,000 mcg into a muscle every 30 (thirty) days  -     cyanocobalamin 1,000 mcg/mL; INJECT 1 ML (1,000 MCG TOTAL) INJECT INTO THE MUSCLE EVERY 30 (THIRTY) DAYS  Normal gynecological physical examination  Self-breast examination stressed  Mammogram ordered  Discussed regular exercise, healthy diet, importance of vitamin D and calcium supplements  Discussed importance of sun block use during periods of prolonged sun exposure  Patient will be seen in 1 year for routine gynecologic and medical examination  Patient will call office for any problems, concerns, or issues which may arise during the interim  Subjective:          Pt presents today for annual exam  Pt states she was given Augmentin due to pilonidal cyst and is now experiencing some yellow/white vaginal discharge and some itching/burning  Pt denies any dysuria, abdominal pain, flank pain, N/V/D, chest pain, SOB, headaches, vision changes, fevers, chills  Pt is up to date on mammogram and colonoscopy         Patient ID: Vladimir Harrison is a 48 y o  female who presents today for her annual gynecologic and medical examination    Menstrual status: periods are regular, no irregular bleeding or cramping     Vasomotor symptoms: positive for hot flashes     Patient reports normal appetite    Patient reports normal bowel and bladder habits    Patient denies any significant pelvic or abdominal pain    Patient denies any headaches, chest pain, shortness of breath fever shakes or chills    Patient denies any COVID 19 symptoms including cough or loss of taste or smell    COVID vaccine status: vaccinated x3    Medical problems: followed for anemia and hx of ASCUS    Colonoscopy status: up to date    Mammogram status: up to date and orders placed at this appointment     The following portions of the patient's history were reviewed and updated as appropriate: allergies, current medications, past family history, past medical history, past social history, past surgical history and problem list     Review of Systems   Constitutional: Negative  Negative for activity change, appetite change, chills, diaphoresis, fatigue, fever and unexpected weight change  HENT: Negative  Eyes: Negative  Respiratory: Negative  Negative for cough, shortness of breath and wheezing  Cardiovascular: Negative  Negative for chest pain, palpitations and leg swelling  Gastrointestinal: Negative  Negative for abdominal pain, blood in stool, constipation, diarrhea, nausea and vomiting  Endocrine: Negative  Negative for cold intolerance and heat intolerance  Genitourinary: Negative  Negative for dysuria, frequency, hematuria, urgency, vaginal bleeding, vaginal discharge and vaginal pain  Musculoskeletal: Negative  Skin: Negative  Allergic/Immunologic: Negative  Neurological: Negative  Hematological: Negative  Negative for adenopathy  Psychiatric/Behavioral: Negative  Objective:      /58   Ht 5' 8 5" (1 74 m)   Wt 63 5 kg (140 lb)   LMP 11/02/2022 (Exact Date)   BMI 20 98 kg/m²          Physical Exam  Vitals reviewed  Exam conducted with a chaperone present  Constitutional:       General: She is not in acute distress  Appearance: Normal appearance  She is well-developed  She is not ill-appearing, toxic-appearing or diaphoretic  HENT:      Head: Normocephalic and atraumatic  Eyes:      Conjunctiva/sclera: Conjunctivae normal       Pupils: Pupils are equal, round, and reactive to light  Cardiovascular:      Rate and Rhythm: Normal rate and regular rhythm  Pulses: Normal pulses  Heart sounds: Normal heart sounds  No murmur heard  No friction rub  No gallop  Pulmonary:      Effort: Pulmonary effort is normal       Breath sounds: Normal breath sounds  Chest:   Breasts:     Breasts are symmetrical       Right: No inverted nipple, mass, nipple discharge, skin change or tenderness  Left: No inverted nipple, mass, nipple discharge, skin change or tenderness  Abdominal:      General: Bowel sounds are normal  There is no distension  Palpations: Abdomen is soft  Tenderness: There is no abdominal tenderness  There is no guarding  Genitourinary:     General: Normal vulva  Exam position: Supine  Labia:         Right: No rash, tenderness, lesion or injury  Left: No rash, tenderness, lesion or injury  Urethra: No urethral pain, urethral swelling or urethral lesion  Vagina: Normal  No vaginal discharge, erythema, tenderness or bleeding  Cervix: No discharge, friability, lesion, erythema or cervical bleeding  Uterus: Normal  Not enlarged and not tender  Adnexa: Right adnexa normal and left adnexa normal         Right: No mass, tenderness or fullness  Left: No mass, tenderness or fullness  Rectum: Normal  Guaiac result negative  Musculoskeletal:         General: Normal range of motion  Cervical back: Normal range of motion and neck supple  Lymphadenopathy:      Cervical: No cervical adenopathy  Upper Body:      Right upper body: No supraclavicular adenopathy        Left upper body: No supraclavicular adenopathy  Skin:     General: Skin is warm and dry  Findings: No rash  Neurological:      General: No focal deficit present  Mental Status: She is alert and oriented to person, place, and time  Gait: Gait normal    Psychiatric:         Mood and Affect: Mood normal          Speech: Speech normal          Behavior: Behavior normal          Thought Content:  Thought content normal          Judgment: Judgment normal

## 2022-12-14 NOTE — PATIENT INSTRUCTIONS
Normal gynecological physical examination  Pap done today   Self-breast examination stressed  Mammogram ordered  Discussed regular exercise, healthy diet, importance of vitamin D and calcium supplements  Discussed importance of sun block use during periods of prolonged sun exposure  Patient will be seen in 1 year for routine gynecologic and medical examination  Patient will call office for any problems, concerns, or issues which may arise during the interim    Follow-up pelvic ultrasound will be obtained in 3 months to check for stability

## 2022-12-17 LAB — BACTERIA GENITAL AEROBE CULT: NORMAL

## 2022-12-21 LAB
LAB AP GYN PRIMARY INTERPRETATION: NORMAL
LAB AP LMP: NORMAL
Lab: NORMAL

## 2023-02-06 DIAGNOSIS — Z12.31 ENCOUNTER FOR SCREENING MAMMOGRAM FOR MALIGNANT NEOPLASM OF BREAST: ICD-10-CM

## 2023-02-15 ENCOUNTER — TELEPHONE (OUTPATIENT)
Dept: OBGYN CLINIC | Facility: CLINIC | Age: 48
End: 2023-02-15

## 2023-02-15 DIAGNOSIS — N63.20 MASS OF LEFT BREAST, UNSPECIFIED QUADRANT: ICD-10-CM

## 2023-02-15 DIAGNOSIS — N64.89 BREAST ASYMMETRY: Primary | ICD-10-CM

## 2023-02-15 NOTE — TELEPHONE ENCOUNTER
Pt had diag bilat mgram 2/2/2023 [facility] (L) breast lump) - (L) asymmetry UOQ but not exactly in area of lump - had (L) breast U/S showing fibrocystic patch/ducts - recom 6 month f/u mgram & U/S - impression says (R) breast but contacted [facility] Dewane Bulla) - will recall to confirm if this is typo error  Then will call pt & sign 6 month orders

## 2023-02-15 NOTE — TELEPHONE ENCOUNTER
----- Message from Ruchi Farley MD sent at 2/14/2023 10:42 PM EST -----  Please make sure patient has follow-up diagnostic right breast ultrasound and mammogram in 6 months    Thanks

## 2023-02-17 NOTE — TELEPHONE ENCOUNTER
Ph call from [address] [place] [address] ([facility]) on 2/16/2023 - report ammended & follow up is for (L) breast   Lm pt's as - rad orders in pt's chart

## 2023-03-13 ENCOUNTER — TELEPHONE (OUTPATIENT)
Dept: OBGYN CLINIC | Facility: CLINIC | Age: 48
End: 2023-03-13

## 2023-03-13 NOTE — TELEPHONE ENCOUNTER
----- Message from Lotus Sainz sent at 3/9/2023  3:43 PM EST -----    ----- Message -----  From: Colonel Eloina MD  Sent: 2/27/2023   3:03 PM EST  To: Lotus Sainz    Diagnostic breast imaging was reassuring    Patient needs diagnostic left breast mammogram and ultrasound in 6 months    Thanks

## 2023-03-13 NOTE — TELEPHONE ENCOUNTER
----- Message from Brenda Duff sent at 3/9/2023  3:43 PM EST -----    ----- Message -----  From: Calin Aguilar MD  Sent: 2/27/2023   3:03 PM EST  To: Brenda Duff    Diagnostic breast imaging was reassuring    Patient needs diagnostic left breast mammogram and ultrasound in 6 months    Thanks

## 2023-03-27 PROBLEM — K59.09 OTHER CONSTIPATION: Status: ACTIVE | Noted: 2023-03-27

## 2023-03-27 PROBLEM — E66.9 OBESITY, CLASS I, BMI 30-34.9: Status: RESOLVED | Noted: 2020-02-04 | Resolved: 2023-03-27

## 2023-03-27 PROBLEM — E66.01 MORBID OBESITY (HCC): Status: RESOLVED | Noted: 2021-10-04 | Resolved: 2023-03-27

## 2023-03-30 ENCOUNTER — ULTRASOUND (OUTPATIENT)
Dept: OBGYN CLINIC | Facility: CLINIC | Age: 48
End: 2023-03-30

## 2023-03-30 DIAGNOSIS — D21.9 FIBROID: ICD-10-CM

## 2023-03-30 DIAGNOSIS — N93.9 ABNORMAL UTERINE BLEEDING (AUB): Primary | ICD-10-CM

## 2023-03-30 DIAGNOSIS — N83.201 CYST OF RIGHT OVARY: ICD-10-CM

## 2023-03-30 NOTE — PROGRESS NOTES
AMB US Pelvic Non OB    Date/Time: 3/30/2023 12:49 PM  Performed by: Guanaco Bales  Authorized by: Rodri Stringer MD     Procedure details:     Indications: leiomyoma, ovarian cysts, non-obstetric vaginal bleeding and intermenstrual blood loss      Technique:  US Pelvic, Non-OB with complete exam  Uterine findings:     Length (cm): 8 1    Height (cm):  5 9    Width (cm):  6    Uterine adhesions: not identified      Adnexal mass: identified      Location:  Mass right    Polyps: not identified      Myomas: identified      Endometrial stripe: identified      Endometrial hyperplasia: not identified      Endometrium thickness (mm):  10  Left ovary findings:     Left ovary:  Visualized    Cysts: not identified      Length (cm): 2 7    Height (cm): 2 6    Width (cm): 1 6  Right ovary findings:     Right ovary:  Visualized    Cysts: identified      Length (cm): 5 1    Height (cm): 4 4    Width (cm): 5 7    Flow:  Absent  Other findings:     Free pelvic fluid: not identified      Free peritoneal fluid: not identified    Post-Procedure Details:     Impression:  Endo-10  RT Cyst=44 x 42 x 41 mm, with no doppler flow wiyhin  Fibroids-degenerating Post-32 x 27 x 26 mm,&29 x 28 x 23 mm    Tolerance: Tolerated well, no immediate complications  Additional Procedure Comments:          Dr Negro Gold MD  [address] [facility] [address] [address]  [phone]

## 2023-05-11 ENCOUNTER — ULTRASOUND (OUTPATIENT)
Dept: OBGYN CLINIC | Facility: CLINIC | Age: 48
End: 2023-05-11

## 2023-05-11 DIAGNOSIS — R10.2 PELVIC PAIN: Primary | ICD-10-CM

## 2023-05-11 NOTE — PROGRESS NOTES
AMB US Pelvic Non OB    Date/Time: 5/11/2023 1:30 PM  Performed by: Ritchie Pacheco  Authorized by: Isadora Reeves MD     Procedure details:     Indications: ovarian cysts and non-obstetric abdominal pain      Technique:  US Pelvic, Non-OB with complete exam  Uterine findings:     Length (cm): 8 9    Height (cm):  6    Width (cm):  5 6    Uterine adhesions: not identified      Adnexal mass: not identified      Polyps: not identified      Myomas: not identified      Endometrial stripe: identified      Endometrial hyperplasia: not identified      Endometrium thickness (mm):  19  Left ovary findings:     Left ovary:  Visualized    Cysts: not identified      Length (cm): 3 7    Height (cm): 2 6    Width (cm): 2 5  Right ovary findings:     Right ovary:  Visualized    Cysts: not identified      Length (cm): 3    Height (cm): 3 3    Width (cm): 2 8  Other findings:     Free pelvic fluid: not identified      Free peritoneal fluid: not identified    Post-Procedure Details:     Impression:  Endo-19mm, No cyst seen today    Tolerance: Tolerated well, no immediate complications  Additional Procedure Comments:          Dr Aki Julian MD  [address] [facility] [address] [id number] [address]  [phone]
Pelvic ultrasound was reviewed and findings reveal no significant abnormalities  All findings were within normal limits      Results were discussed in detail with the patient    She will be seen for routine follow-up as planned    Patient will call for any problems, questions, issues or concerns which may arise for her
PAST MEDICAL HISTORY:  HTN (hypertension)

## 2023-08-31 ENCOUNTER — TELEPHONE (OUTPATIENT)
Dept: OBGYN CLINIC | Facility: CLINIC | Age: 48
End: 2023-08-31

## 2023-08-31 DIAGNOSIS — R92.8 ABNORMAL MAMMOGRAM OF LEFT BREAST: Primary | ICD-10-CM

## 2023-08-31 NOTE — TELEPHONE ENCOUNTER
----- Message from Torie Lainez MD sent at 8/31/2023 11:09 AM EDT -----  Diagnostic imaging was stable at this time    Please schedule for diagnostic bilateral mammogram in 6 months    Thanks

## 2023-10-19 PROBLEM — E61.1 IRON DEFICIENCY: Status: ACTIVE | Noted: 2023-10-19

## 2023-12-13 ENCOUNTER — ANNUAL EXAM (OUTPATIENT)
Dept: OBGYN CLINIC | Facility: CLINIC | Age: 48
End: 2023-12-13
Payer: MEDICARE

## 2023-12-13 VITALS — WEIGHT: 138.4 LBS | HEIGHT: 69 IN | BODY MASS INDEX: 20.5 KG/M2

## 2023-12-13 DIAGNOSIS — Z01.419 WOMEN'S ANNUAL ROUTINE GYNECOLOGICAL EXAMINATION: Primary | ICD-10-CM

## 2023-12-13 DIAGNOSIS — Z12.11 SCREENING FOR COLON CANCER: ICD-10-CM

## 2023-12-13 DIAGNOSIS — Z12.31 ENCOUNTER FOR SCREENING MAMMOGRAM FOR MALIGNANT NEOPLASM OF BREAST: ICD-10-CM

## 2023-12-13 DIAGNOSIS — N95.2 ATROPHIC VAGINITIS: ICD-10-CM

## 2023-12-13 DIAGNOSIS — N89.8 VAGINAL ODOR: ICD-10-CM

## 2023-12-13 DIAGNOSIS — Z01.419 PAP SMEAR, AS PART OF ROUTINE GYNECOLOGICAL EXAMINATION: ICD-10-CM

## 2023-12-13 PROCEDURE — G0101 CA SCREEN;PELVIC/BREAST EXAM: HCPCS | Performed by: OBSTETRICS & GYNECOLOGY

## 2023-12-13 PROCEDURE — G0476 HPV COMBO ASSAY CA SCREEN: HCPCS | Performed by: OBSTETRICS & GYNECOLOGY

## 2023-12-13 PROCEDURE — G0145 SCR C/V CYTO,THINLAYER,RESCR: HCPCS | Performed by: OBSTETRICS & GYNECOLOGY

## 2023-12-13 PROCEDURE — 81514 NFCT DS BV&VAGINITIS DNA ALG: CPT | Performed by: OBSTETRICS & GYNECOLOGY

## 2023-12-13 RX ORDER — METHYLPREDNISOLONE 4 MG/1
TABLET ORAL
COMMUNITY
Start: 2023-09-14

## 2023-12-13 RX ORDER — DOCUSATE SODIUM -SENNOSIDES 50; 8.6 MG/1; MG/1
1 TABLET, COATED ORAL 2 TIMES DAILY
COMMUNITY

## 2023-12-13 RX ORDER — ASCORBIC ACID 500 MG
500 TABLET ORAL 2 TIMES DAILY
COMMUNITY
Start: 2023-10-24

## 2023-12-13 NOTE — PROGRESS NOTES
Assessment/Plan:    No problem-specific Assessment & Plan notes found for this encounter. Diagnoses and all orders for this visit:    Women's annual routine gynecological examination  -     Liquid-based pap, screening    Pap smear, as part of routine gynecological examination    Encounter for screening mammogram for malignant neoplasm of breast  -     Mammo screening bilateral w 3d & cad; Future    Atrophic vaginitis    Vaginal odor  -     Cancel: Chlamydia/GC amplified DNA by PCR  -     Molecular Vaginal Panel    Screening for colon cancer  -     Cologuard    Other orders  -     ascorbic acid (VITAMIN C) 500 mg tablet; Take 500 mg by mouth 2 (two) times a day (Patient not taking: Reported on 12/13/2023)  -     hydrocortisone 2.5 % cream; APPLY TWICE DAILY X UP TO 2 WEEKS AS NEEDED UNDERARMS AND CHEST. -     methylPREDNISolone 4 MG tablet therapy pack; TAKE 6 TABLETS ON DAY 1 AS DIRECTED ON PACKAGE AND DECREASE BY 1 TAB EACH DAY FOR A TOTAL OF 6 DAYS  -     Senexon-S 8.6-50 MG per tablet; Take 1 tablet by mouth 2 (two) times a day          Normal gynecological physical examination. Self-breast examination stressed. Mammogram ordered. Discussed regular exercise, healthy diet, importance of vitamin D and calcium supplements. Discussed importance of sun block use during periods of prolonged sun exposure. Patient will be seen in 1 year for routine gynecologic and medical examination. Patient will call office for any problems, concerns, or issues which may arise during the interim. Subjective:          Shlomo Webber is a 50 y.o. female who presents today for her annual gynecologic and medical examination, she reports concerns with ongoing vaginal odor. She states that recently she has noticed an abnormal smell from her vagina, but denies any abnormal discharge, vaginal or vulvar itching, intermenstrual bleeding, and fevers.  She states that she does have ongoing pelvic pain but this has been consistent with her baseline due to prior history of endometriosis, chronic constipation, and possible adhesions from multiple prior abdominal procedures. She states that she is not currently sexually active and would like to be tested with routine vaginal cultures including BV and yeast.    She reports regular periods with longer cycles recently averaging around 34-36 days, accompanied by heavy cramping, premenstrual breast pain and premenstrual back pain. She states that she skipped one period this year but otherwise denies any other skipped periods and intermenstrual bleeding. She also reports chronic issues with urinary frequency and urinary urgency but this is consistent with her baseline. She was seen by urology earlier this year who started her on Mybetriq with no relief of symptoms, and she has since decided to manage on her own. She denies any dysuria, hematuria, pelvic pressure, fevers, chest pain, shortness of breath, headaches and vision changes. She was last seen in March for a right ovarian cyst that had resolved on follow up pelvic ultrasound in may. She also reports an abnormal benign mammogram in September, which she is scheduled to repeat in February. Vaginal cultures obtained at today's visit, will contact with results and to discuss treatment if abnormal.     Pelvic pain in left lower quadrant on exam, will schedule pelvic ultrasound to assess given history of ovarian cysts. Patient ID: Didi Rico is a 50 y.o. female who presents today for her annual gynecologic and medical examination    Menstrual status:  Reports regular periods every 34-36 days with heavy cramping, premenstrual cramps and breast tenderness.  Reports 1 skipped period this year, otherwise denies skipped periods and intermenstrual bleeding    Vasomotor symptoms:  Denies hot flashes, heat intolerance and excessive sweating    Patient reports normal appetite    Patient reports normal bowel and bladder habits    Patient denies any significant pelvic or abdominal pain    Patient denies any headaches, chest pain, shortness of breath fever shakes or chills    Patient denies any COVID 19 symptoms including cough or loss of taste or smell    COVID vaccine status:  Has not received covid vaccine series, aware of current guidelines and recommendations    Medical problems: Followed by GI for constipation and family history of colon cancer    Colonoscopy status: Due for colorectal cancer screening, not currently interested in colonoscopy aware of recommendations given family history, would like cologuard screening. Mammogram status: Up to date on mammogram, scheduled for follow up diagnostic mammogram given recent benign abnormal mammogram. Discussed importance of regular self breast exams    The following portions of the patient's history were reviewed and updated as appropriate: allergies, current medications, past family history, past medical history, past social history, past surgical history and problem list.    Review of Systems   Constitutional: Negative. Negative for appetite change, diaphoresis, fatigue, fever and unexpected weight change. HENT: Negative. Eyes: Negative. Respiratory: Negative. Cardiovascular: Negative. Gastrointestinal:  Positive for constipation. Negative for abdominal pain, blood in stool, diarrhea, nausea and vomiting. Follows with GI   Endocrine: Negative. Negative for cold intolerance and heat intolerance. Genitourinary:  Positive for frequency, pelvic pain and urgency. Negative for dysuria, hematuria, vaginal bleeding, vaginal discharge and vaginal pain. Musculoskeletal: Negative. Skin: Negative. Allergic/Immunologic: Negative. Neurological: Negative. Hematological: Negative. Negative for adenopathy. Psychiatric/Behavioral: Negative.            Objective:      Ht 5' 9" (1.753 m)   Wt 62.8 kg (138 lb 6.4 oz)   LMP 11/08/2023 (Exact Date)   BMI 20.44 kg/m² Physical Exam  Constitutional:       General: She is not in acute distress. Appearance: Normal appearance. She is well-developed. She is not diaphoretic. HENT:      Head: Normocephalic and atraumatic. Eyes:      Pupils: Pupils are equal, round, and reactive to light. Cardiovascular:      Rate and Rhythm: Normal rate and regular rhythm. Heart sounds: Normal heart sounds. No murmur heard. No friction rub. No gallop. Pulmonary:      Effort: Pulmonary effort is normal.      Breath sounds: Normal breath sounds. Chest:   Breasts:     Breasts are symmetrical.      Right: No inverted nipple, mass, nipple discharge, skin change or tenderness. Left: No inverted nipple, mass, nipple discharge, skin change or tenderness. Comments: Symmetrical status post bilateral mastopexy  Abdominal:      General: Bowel sounds are normal.      Palpations: Abdomen is soft. Genitourinary:     General: Normal vulva. Exam position: Supine. Labia:         Right: No rash or lesion. Left: No rash or lesion. Vagina: Normal. No vaginal discharge, erythema, tenderness or bleeding. Cervix: No discharge or friability. Uterus: Not enlarged and not tender. Adnexa:         Right: No mass, tenderness or fullness. Left: Tenderness present. No mass or fullness. Rectum: Normal. Guaiac result negative. Comments: Nabothian cyst noted at 5 o clock by cervical os, mild atrophic vaginitis on exam, good pelvic support confirmed  Musculoskeletal:         General: Normal range of motion. Cervical back: Normal range of motion and neck supple. Lymphadenopathy:      Cervical: No cervical adenopathy. Upper Body:      Right upper body: No supraclavicular adenopathy. Left upper body: No supraclavicular adenopathy. Skin:     General: Skin is warm and dry. Findings: No rash. Neurological:      General: No focal deficit present.       Mental Status: She is alert and oriented to person, place, and time. Psychiatric:         Mood and Affect: Mood normal.         Speech: Speech normal.         Behavior: Behavior normal.         Thought Content:  Thought content normal.         Judgment: Judgment normal.

## 2023-12-13 NOTE — PATIENT INSTRUCTIONS
Normal gynecological physical examination. Self-breast examination stressed. Mammogram ordered. Discussed regular exercise, healthy diet, importance of vitamin D and calcium supplements. Discussed importance of sun block use during periods of prolonged sun exposure. Patient will be seen in 1 year for routine gynecologic and medical examination. Patient will call office for any problems, concerns, or issues which may arise during the interim. Topic: abnormal vaginal odor, pelvic pain  Reports abnormal vaginal odor without discharge, vaginal itching and fevers. Ongoing pelvic and abdominal pain, urinary urgency and urinary frequency    Vaginal cultures obtained at today's visit, will contact with results and to discuss treatment if abnormal.     Pelvic pain in left lower quadrant on exam, will schedule pelvic ultrasound to assess given history of ovarian cysts.     Given family history of colon cancer and interest in cologuard rather than colonoscopy for colorectal cancer screening, order placed at today's visit for cologuard

## 2023-12-14 LAB
C GLABRATA DNA VAG QL NAA+PROBE: NEGATIVE
C KRUSEI DNA VAG QL NAA+PROBE: NEGATIVE
CANDIDA SP 6 PNL VAG NAA+PROBE: POSITIVE
T VAGINALIS DNA VAG QL NAA+PROBE: NEGATIVE
VAGINOSIS/ITIS DNA PNL VAG PROBE+SIG AMP: NEGATIVE

## 2023-12-19 ENCOUNTER — TELEPHONE (OUTPATIENT)
Dept: OBGYN CLINIC | Facility: CLINIC | Age: 48
End: 2023-12-19

## 2023-12-19 DIAGNOSIS — B37.31 VAGINAL CANDIDIASIS: Primary | ICD-10-CM

## 2023-12-19 RX ORDER — FLUCONAZOLE 150 MG/1
TABLET ORAL
Qty: 2 TABLET | Refills: 0 | Status: SHIPPED | OUTPATIENT
Start: 2023-12-19 | End: 2023-12-21

## 2023-12-19 NOTE — TELEPHONE ENCOUNTER
Patient viewing vaginal culture results in My Chart from 12/13/2023 showing (+) yeast & inquiring about taking Diflucan.  If agree, please sign off on prescription [facility] to [facility] ([place])

## 2023-12-21 ENCOUNTER — TELEPHONE (OUTPATIENT)
Dept: OBGYN CLINIC | Facility: CLINIC | Age: 48
End: 2023-12-21

## 2023-12-21 LAB
LAB AP GYN PRIMARY INTERPRETATION: NORMAL
Lab: NORMAL

## 2023-12-21 NOTE — TELEPHONE ENCOUNTER
Patient requesting refill of   estradiol (ESTRACE) 0.1 mg/g vaginal cream   To [facility] in [place] (on file)

## 2023-12-22 DIAGNOSIS — N95.2 VAGINAL ATROPHY: ICD-10-CM

## 2023-12-22 RX ORDER — ESTRADIOL 0.1 MG/G
CREAM VAGINAL
Qty: 42.5 G | Refills: 1 | Status: SHIPPED | OUTPATIENT
Start: 2023-12-22

## 2023-12-22 NOTE — TELEPHONE ENCOUNTER
Faxed 2019 colonoscopy report to [facility] at [facility] [phone] Please let Amanda know that the prescription has been sent to the [facility] in [place]    Thanks

## 2024-01-23 ENCOUNTER — TELEPHONE (OUTPATIENT)
Dept: OBGYN CLINIC | Facility: CLINIC | Age: 49
End: 2024-01-23

## 2024-01-23 NOTE — TELEPHONE ENCOUNTER
"Patient had taken Diflucan 12/19/2023 x 2 doses 3 days apart for (+) yeast on culture results 12/13/2023.  Vaginal discharge & itching did not improve.  Same symptoms presently & also states she has 2 area near clitoris that look \"raw, like ulcer, painful, bright red\".  Recommend appointment - scheduled.  "

## 2024-01-25 ENCOUNTER — OFFICE VISIT (OUTPATIENT)
Dept: OBGYN CLINIC | Facility: CLINIC | Age: 49
End: 2024-01-25
Payer: MEDICARE

## 2024-01-25 VITALS — BODY MASS INDEX: 20.44 KG/M2 | HEIGHT: 69 IN | DIASTOLIC BLOOD PRESSURE: 80 MMHG | SYSTOLIC BLOOD PRESSURE: 120 MMHG

## 2024-01-25 DIAGNOSIS — R35.0 URINARY FREQUENCY: ICD-10-CM

## 2024-01-25 DIAGNOSIS — Z01.419 ENCOUNTER FOR GYNECOLOGICAL EXAMINATION WITHOUT ABNORMAL FINDING: ICD-10-CM

## 2024-01-25 DIAGNOSIS — N89.8 VAGINAL DISCHARGE: Primary | ICD-10-CM

## 2024-01-25 DIAGNOSIS — N95.2 ATROPHIC VAGINITIS: ICD-10-CM

## 2024-01-25 DIAGNOSIS — N95.1 MENOPAUSAL SYMPTOMS: ICD-10-CM

## 2024-01-25 LAB
SL AMB  POCT GLUCOSE, UA: ABNORMAL
SL AMB LEUKOCYTE ESTERASE,UA: ABNORMAL
SL AMB POCT BILIRUBIN,UA: ABNORMAL
SL AMB POCT BLOOD,UA: ABNORMAL
SL AMB POCT CLARITY,UA: CLEAR
SL AMB POCT COLOR,UA: YELLOW
SL AMB POCT KETONES,UA: ABNORMAL
SL AMB POCT NITRITE,UA: ABNORMAL
SL AMB POCT PH,UA: 5
SL AMB POCT SPECIFIC GRAVITY,UA: 1.01
SL AMB POCT URINE PROTEIN: ABNORMAL
SL AMB POCT UROBILINOGEN: 0.2

## 2024-01-25 PROCEDURE — 81514 NFCT DS BV&VAGINITIS DNA ALG: CPT | Performed by: OBSTETRICS & GYNECOLOGY

## 2024-01-25 PROCEDURE — 99214 OFFICE O/P EST MOD 30 MIN: CPT | Performed by: OBSTETRICS & GYNECOLOGY

## 2024-01-25 PROCEDURE — 87086 URINE CULTURE/COLONY COUNT: CPT | Performed by: OBSTETRICS & GYNECOLOGY

## 2024-01-25 PROCEDURE — 81002 URINALYSIS NONAUTO W/O SCOPE: CPT | Performed by: OBSTETRICS & GYNECOLOGY

## 2024-01-25 RX ORDER — CYCLOBENZAPRINE HCL 5 MG
TABLET ORAL
COMMUNITY

## 2024-01-25 NOTE — PROGRESS NOTES
"Assessment/Plan:    No problem-specific Assessment & Plan notes found for this encounter.       Diagnoses and all orders for this visit:    Vaginal discharge    Menopausal symptoms    Atrophic vaginitis    Encounter for gynecological examination without abnormal finding    Other orders  -     cyclobenzaprine (FLEXERIL) 5 mg tablet          Subjective:      Patient ID: May Ponce is a 48 y.o. female who presents to the office today with a complaint of an unresolved yeast infection and vaginal pain.  Patient was treated twice for a yeast infection with Diflucan.  She states that her symptoms improved after the first round but that they came back after starting the second.  Patient has noticed increased vaginal discharge, vaginal itching, vaginal irritation, and pain around the periclitoral area.  She states that after showering, she noticed a red \"ulcer-like lesion\" that is painful to the touch on the right side of her clitoris.  Patient states that her menstrual cycles used to be 28 days in length and are now stretching out to 31 days.  She states that her cycles are regular and light in flow.  She endorses PMS symptoms including breast tenderness, mood swings, abdominal cramping, and occasional nausea.  As of recent, she has noticed increased hot flashes and sweats at nighttime.  Patient denies current sexual activity.  She has tried coconut oil on the external surface of the vulva to relieve vaginal irritation.  She denies fevers, chills, back pain, and changes in appetite.  She states that she has noticed dysuria and urinary frequency.  She is unsure if she has a UTI but has increased her water and cranberry juice intake.  Patient is a  and reports wearing tight fitting yoga pants 2-3 times a day.  Patient has no other complaints or concerns at this time.    Total time of today's visit was 25 minutes of which greater than 50% was spent face-to-face counseling the patient as well as coordination " "of care, review of chart and lab values, physical examination as well as computer entry into the [facility] medical record system.    HPI    The following portions of the patient's history were reviewed and updated as appropriate: allergies, current medications, past family history, past medical history, past social history, past surgical history, and problem list.    Review of Systems   Constitutional:  Positive for diaphoresis. Negative for appetite change, chills and fever.   HENT: Negative.     Eyes: Negative.    Respiratory: Negative.  Negative for shortness of breath.    Cardiovascular: Negative.  Negative for chest pain.   Gastrointestinal:  Positive for abdominal pain.   Endocrine: Positive for heat intolerance.   Genitourinary:  Positive for dysuria, frequency, vaginal discharge and vaginal pain. Negative for menstrual problem and vaginal bleeding.   Musculoskeletal: Negative.    Skin: Negative.    Neurological: Negative.    Psychiatric/Behavioral: Negative.           Objective:      /80   Ht 5' 9\" (1.753 m)   LMP 12/27/2023   BMI 20.44 kg/m²          Physical Exam  Exam conducted with a chaperone present.   Constitutional:       Appearance: Normal appearance. She is well-developed.   HENT:      Head: Normocephalic.   Eyes:      Pupils: Pupils are equal, round, and reactive to light.   Cardiovascular:      Rate and Rhythm: Normal rate and regular rhythm.   Pulmonary:      Effort: Pulmonary effort is normal.      Breath sounds: Normal breath sounds.   Abdominal:      General: Bowel sounds are normal.      Palpations: Abdomen is soft.   Genitourinary:     General: Normal vulva.      Exam position: Supine.      Labia:         Right: No rash, tenderness, lesion or injury.         Left: No rash, tenderness, lesion or injury.       Vagina: Vaginal discharge present. No erythema, tenderness or bleeding.      Cervix: No discharge or friability.      Uterus: Not enlarged, not fixed and not tender.       Adnexa:   "       Right: Tenderness present. No mass or fullness.          Left: No mass, tenderness or fullness.        Rectum: Normal.          Comments: Several shiny, dry, erythematous spots appreciated on the right side of the clitoris.  White vaginal discharge seen in the vaginal canal and cervical os.  Patient experienced slight discomfort with palpation of the right adnexal area.  Musculoskeletal:         General: Normal range of motion.      Cervical back: Normal range of motion and neck supple.   Lymphadenopathy:      Cervical: No cervical adenopathy.   Skin:     General: Skin is warm and dry.      Findings: No rash.   Neurological:      Mental Status: She is alert and oriented to person, place, and time.   Psychiatric:         Speech: Speech normal.         Behavior: Behavior normal.         Thought Content: Thought content normal.         Judgment: Judgment normal.

## 2024-01-25 NOTE — PATIENT INSTRUCTIONS
Patient presents with a complaint of an unresolved yeast infection, vaginal pain, and increase in menopausal symptoms.    Vaginal cultures and urine sample obtained in office.  Patient will be notified of the results.  Pending above results, patient will be notified for consultation regarding further treatment.    Clitoral lesions most likely due to atrophic vaginitis and menopause.  Patient encouraged to continue using the Kenalog topical steroid cream to prevent vaginal irritation and pain.    Spoke to the patient about the use of natural coconut oil.  She can apply it generously on the external and internal vaginal surface when needed.    Patient should keep track of her vasovagal symptoms and notify the office if they increase in severity or frequency.    Patient to follow-up in the office in 2 months for an ultrasound for evaluation of her ovarian cysts.    Patient to follow-up in 11 months for an annual wellness exam visit.  She can call the office for an earlier appointment if she experiences any questions, concerns, or complaints that arise in the meantime.

## 2024-01-26 ENCOUNTER — TELEPHONE (OUTPATIENT)
Dept: OBGYN CLINIC | Facility: CLINIC | Age: 49
End: 2024-01-26

## 2024-01-26 DIAGNOSIS — B37.31 YEAST VAGINITIS: Primary | ICD-10-CM

## 2024-01-26 RX ORDER — FLUCONAZOLE 150 MG/1
TABLET ORAL
Qty: 2 TABLET | Refills: 0 | Status: SHIPPED | OUTPATIENT
Start: 2024-01-26 | End: 2024-01-28

## 2024-01-26 NOTE — TELEPHONE ENCOUNTER
----- Message from Stan De Leon MD sent at 1/26/2024 12:46 PM EST -----  Culture positive for yeast again    Will treat with Diflucan 150 mg dispense 2 tablets take 1 tablet today followed by 1 tab in 2 days    Please encourage probiotics as well as yogurt    Thanks

## 2024-01-26 NOTE — TELEPHONE ENCOUNTER
Patient informed of (+) yeast of vaginal culture results 1/25/2024 - recommend treatment with Diflucan x 2 doses/J MD Haley.  Urine culture results pending.  Please sign off on prescription [facility] ([place])

## 2024-01-28 LAB
BACTERIA UR CULT: NORMAL
COLOGUARD RESULT REPORTABLE: POSITIVE

## 2024-01-29 ENCOUNTER — TELEPHONE (OUTPATIENT)
Dept: OBGYN CLINIC | Facility: CLINIC | Age: 49
End: 2024-01-29

## 2024-01-29 DIAGNOSIS — Z12.11 ENCOUNTER FOR SCREENING COLONOSCOPY: Primary | ICD-10-CM

## 2024-01-29 NOTE — TELEPHONE ENCOUNTER
Pt informed about vaginal cultures along with urine culture. Pt already informed about results prior to call and got antibiotics, taking next pill on Tuesday to cure the yeast. Call complete       Along with a referral to colonoscopy specialist due to abnormal results.

## 2024-01-29 NOTE — TELEPHONE ENCOUNTER
----- Message from Stan De Leon MD sent at 1/29/2024  9:03 AM EST -----  Urine culture was negative    Thanks

## 2024-02-16 PROBLEM — K62.5 RECTAL BLEEDING: Status: ACTIVE | Noted: 2024-02-16

## 2024-02-23 ENCOUNTER — NURSE TRIAGE (OUTPATIENT)
Age: 49
End: 2024-02-23

## 2024-02-23 ENCOUNTER — TELEPHONE (OUTPATIENT)
Dept: OBGYN CLINIC | Facility: CLINIC | Age: 49
End: 2024-02-23

## 2024-02-23 NOTE — TELEPHONE ENCOUNTER
"Patient called into office stating she has been bleeding excessively for three days.  Stated she took boric acid for a chronic yeast infection and believes this is the cause.      She last had her cycle 19 days ago.  The excessive bleeding started three days ago - bleeding through over night pads, tampons and waterproof panties.  Patient is anemic and stated she also feels weak.      Advised patient that she should be seen ASAP at the closest emergency department.  Patient declined going to the ED since she has to go to work.  Nurse reiterated the importance of being evaluated ASAP due to being anemic, weak and excessive bleeding occurring for the past three days.     Patient still declined and just wanted to let the office know to see if medication could be called in.  Message routed to office as high priority, message sent to provider    Reason for Disposition   SEVERE vaginal bleeding (e.g., soaking 2 pads or tampons per hour and present 2 or more hours; 1 menstrual cup every 2 hours)    Answer Assessment - Initial Assessment Questions  1. AMOUNT: \"Describe the bleeding that you are having.\"     - SPOTTING: spotting, or pinkish / brownish mucous discharge; does not fill panti-liner or pad     - MILD:  less than 1 pad / hour; less than patient's usual menstrual bleeding    - MODERATE: 1-2 pads / hour; 1 menstrual cup every 6 hours; small-medium blood clots (e.g., pea, grape, small coin)    - SEVERE: soaking 2 or more pads/hour for 2 or more hours; 1 menstrual cup every 2 hours; bleeding not contained by pads or continuous red blood from vagina; large blood clots (e.g., golf ball, large coin)       Severe    2. ONSET: \"When did the bleeding begin?\" \"Is it continuing now?\"      3 days    3. MENSTRUAL PERIOD: \"When was the last normal menstrual period?\" \"How is this different than your period?\"      19 days ago    4. REGULARITY: \"How regular are your periods?\"      Regular    5. ABDOMINAL PAIN: \"Do you have any " "pain?\" \"How bad is the pain?\"  (e.g., Scale 1-10; mild, moderate, or severe)    - MILD (1-3): doesn't interfere with normal activities, abdomen soft and not tender to touch     - MODERATE (4-7): interferes with normal activities or awakens from sleep, tender to touch     - SEVERE (8-10): excruciating pain, doubled over, unable to do any normal activities       Mild    6. PREGNANCY: \"Could you be pregnant?\" \"Are you sexually active?\" \"Did you recently give birth?\"      No    7. BREASTFEEDING: \"Are you breastfeeding?\"      No    8. HORMONES: \"Are you taking any hormone medications, prescription or OTC?\" (e.g., birth control pills, estrogen)      No    9. BLOOD THINNERS: \"Do you take any blood thinners?\" (e.g., Coumadin/warfarin, Pradaxa/dabigatran, aspirin)      No    10. CAUSE: \"What do you think is causing the bleeding?\" (e.g., recent gyn surgery, recent gyn procedure; known bleeding disorder, cervical cancer, polycystic ovarian disease, fibroids)          N/a    11. HEMODYNAMIC STATUS: \"Are you weak or feeling lightheaded?\" If Yes, ask: \"Can you stand and walk normally?\"         Weak    12. OTHER SYMPTOMS: \"What other symptoms are you having with the bleeding?\" (e.g., passed tissue, vaginal discharge, fever, menstrual-type cramps)        tired    Protocols used: Vaginal Bleeding - Abnormal-ADULT-[place]    "

## 2024-02-23 NOTE — TELEPHONE ENCOUNTER
Contacted patient, appointment scheduled for Monday, 2-26-24. Informed provider not in office this afternoon.  Patient states still having heavy bleeding, reinforced recommendation for nurse to seek care in ER if bleeding continues

## 2024-02-26 NOTE — TELEPHONE ENCOUNTER
I personally spoke with the patient this morning    Patient reported extremely heavy bleeding as noted in the office note and that it has decreased markedly to just a brown discharge at this time.  Patient took adequate dosing of ibuprofen during the time of the heavy bleeding.    I advised her that we will continue the schedule for pelvic ultrasound next week    In addition we will also check a urine pregnancy test to ensure that it is negative    All questions answered for her at today's phone call

## 2024-03-04 NOTE — PROGRESS NOTES
AMB US Pelvic Non OB    Date/Time: 3/5/2024 11:30 AM    Performed by: Madeline Brown  Authorized by: Stan De Leon MD  Universal Protocol:  Consent: Verbal consent obtained.  Consent given by: patient  Timeout called at: 3/5/2024 10:53 AM.  Patient understanding: patient states understanding of the procedure being performed  Patient identity confirmed: verbally with patient    Procedure details:     SIS Procedure: No    Indications: non-obstetric vaginal bleeding      Technique:  Transvaginal US, Non-OB    Position: lithotomy exam    Uterine findings:     Length (cm): 9.8    Height (cm):  6.13    Width (cm):  7.63    Endometrial stripe: identified      Endometrium thickness (mm):  12.86  Left ovary findings:     Left ovary:  Visualized    Length (cm): 1.97    Height (cm): 1.54    Width (cm): 1.88  Right ovary findings:     Right ovary:  Visualized    Length (cm): 4.99    Height (cm): 3.72    Width (cm): 4.8  Other findings:     Free pelvic fluid: not identified      Free peritoneal fluid: not identified    Post-Procedure Details:     Impression:  Anteverted uterus demonstrates multiple fibroids. Largest are posterior intramural 2.0cm, anterior left intramural 2.3cm, and one less than 1cm lower uterine segment intramural. The right ovary demonstrates a 4.3cm simple cyst. The left ovary appears within normal limits. No free fluid.     Tolerance:  Tolerated well, no immediate complications    Complications: no complications    Additional Procedure Comments:      GE VolusonP8 [id number] transvaginal transducer Serial #[id number] was used to perform the examination today and subsequently followed with high level disinfection utilizing Trophon EPR procedure.     Ultrasound performed at:     [facility]  [address] [place] [address]  [address]  [place] [address]  Phone  [phone]  Fax  [phone]

## 2024-03-05 ENCOUNTER — ULTRASOUND (OUTPATIENT)
Dept: OBGYN CLINIC | Facility: CLINIC | Age: 49
End: 2024-03-05
Payer: MEDICARE

## 2024-03-05 DIAGNOSIS — N93.9 ABNORMAL UTERINE BLEEDING (AUB): Primary | ICD-10-CM

## 2024-03-05 DIAGNOSIS — B37.31 YEAST VAGINITIS: Primary | ICD-10-CM

## 2024-03-05 PROCEDURE — 76830 TRANSVAGINAL US NON-OB: CPT | Performed by: OBSTETRICS & GYNECOLOGY

## 2024-03-05 RX ORDER — FLUCONAZOLE 150 MG/1
TABLET ORAL
Qty: 1 TABLET | Refills: 2 | Status: SHIPPED | OUTPATIENT
Start: 2024-03-05 | End: 2024-03-05

## 2024-03-05 NOTE — PROGRESS NOTES
Pelvic ultrasound reviewed and note is made of the right ovarian cyst    Findings were discussed with the patient    We will order a follow-up scan to check for resolution in approximately 6 weeks    All questions were answered for the patient    Patient to call for any problems, questions, issues or concerns which arise for her

## 2024-03-06 ENCOUNTER — NURSE TRIAGE (OUTPATIENT)
Age: 49
End: 2024-03-06

## 2024-03-06 NOTE — TELEPHONE ENCOUNTER
"Pt called in stating that she had a transvaginal US yesterday d/t having heavy bleeding in the past. Then last night she used a boric acid suppository for a yeast infection that she is having. States that this morning she began with heavy bright red bleeding. States it filled the toilet and her underwear. It just started and she has not used a pad yet. Denies any pain or clots. She denies feeling lightheaded or dizzy. Her LMP was 2/20/24, lasted 6 days with a few of them being very heavy. Pt concerned about heavy bleeding so soon after LMP especially because she is anemic.       Reason for Disposition   Age > 37 years with irregular or excessive bleeding    Answer Assessment - Initial Assessment Questions  1. AMOUNT: \"Describe the bleeding that you are having.\"     - SPOTTING: spotting, or pinkish / brownish mucous discharge; does not fill panti-liner or pad     - MILD:  less than 1 pad / hour; less than patient's usual menstrual bleeding    - MODERATE: 1-2 pads / hour; 1 menstrual cup every 6 hours; small-medium blood clots (e.g., pea, grape, small coin)    - SEVERE: soaking 2 or more pads/hour for 2 or more hours; 1 menstrual cup every 2 hours; bleeding not contained by pads or continuous red blood from vagina; large blood clots (e.g., golf ball, large coin)       Reports heavy bleeding that filled toilet and underwear. Has not put a pad on yet  2. ONSET: \"When did the bleeding begin?\" \"Is it continuing now?\"      This morning  3. MENSTRUAL PERIOD: \"When was the last normal menstrual period?\" \"How is this different than your period?\"      LMP- 2/20/24  5. ABDOMINAL PAIN: \"Do you have any pain?\" \"How bad is the pain?\"  (e.g., Scale 1-10; mild, moderate, or severe)    - MILD (1-3): doesn't interfere with normal activities, abdomen soft and not tender to touch     - MODERATE (4-7): interferes with normal activities or awakens from sleep, tender to touch     - SEVERE (8-10): excruciating pain, doubled over, unable to " "do any normal activities       denies  6. PREGNANCY: \"Could you be pregnant?\" \"Are you sexually active?\" \"Did you recently give birth?\"      denies  7. BREASTFEEDING: \"Are you breastfeeding?\"      denies  8. HORMONES: \"Are you taking any hormone medications, prescription or OTC?\" (e.g., birth control pills, estrogen)      denies  9. BLOOD THINNERS: \"Do you take any blood thinners?\" (e.g., Coumadin/warfarin, Pradaxa/dabigatran, aspirin)      denies  10. CAUSE: \"What do you think is causing the bleeding?\" (e.g., recent gyn surgery, recent gyn procedure; known bleeding disorder, cervical cancer, polycystic ovarian disease, fibroids)          Boric acid suppository? US? Something else going on?  11. HEMODYNAMIC STATUS: \"Are you weak or feeling lightheaded?\" If Yes, ask: \"Can you stand and walk normally?\"         denies  12. OTHER SYMPTOMS: \"What other symptoms are you having with the bleeding?\" (e.g., passed tissue, vaginal discharge, fever, menstrual-type cramps)        denies    Protocols used: Vaginal Bleeding - Abnormal-ADULT-OH    "

## 2024-03-06 NOTE — TELEPHONE ENCOUNTER
Personally spoke with the patient this morning and reviewed her bleeding.  It started after she took the boric acid suppository last night.    Advised her that if bleeding persists that we can get an endometrial biopsy on her to get a baseline status.  She may take 3 Motrin before the procedure.    We also discussed possible therapeutic options including endometrial ablation as well versus continuous progesterone suppression.    We will call her in the morning to see how she is doing and determine whether she needs the endometrial biopsy.    All questions were answered for her during today's call.

## 2024-03-07 NOTE — TELEPHONE ENCOUNTER
Agree with that plan    Please call patient and tell her to be sure to feel free to call should symptoms worsen for her    Thanks
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
## 2024-04-18 ENCOUNTER — OFFICE VISIT (OUTPATIENT)
Age: 49
End: 2024-04-18
Payer: MEDICARE

## 2024-04-18 ENCOUNTER — PREP FOR PROCEDURE (OUTPATIENT)
Age: 49
End: 2024-04-18

## 2024-04-18 ENCOUNTER — TELEPHONE (OUTPATIENT)
Age: 49
End: 2024-04-18

## 2024-04-18 VITALS — WEIGHT: 140.8 LBS | SYSTOLIC BLOOD PRESSURE: 122 MMHG | DIASTOLIC BLOOD PRESSURE: 64 MMHG | BODY MASS INDEX: 20.79 KG/M2

## 2024-04-18 DIAGNOSIS — Z12.11 ENCOUNTER FOR SCREENING COLONOSCOPY: ICD-10-CM

## 2024-04-18 DIAGNOSIS — R19.5 POSITIVE COLORECTAL CANCER SCREENING USING COLOGUARD TEST: Primary | ICD-10-CM

## 2024-04-18 PROCEDURE — 99203 OFFICE O/P NEW LOW 30 MIN: CPT | Performed by: COLON & RECTAL SURGERY

## 2024-04-18 NOTE — TELEPHONE ENCOUNTER
OV DB + Albertina, BMI 20    Scheduled 5/28/2024 at [facility]    Paperwork handed to pt

## 2024-04-18 NOTE — ASSESSMENT & PLAN NOTE
Patient presents to discuss recent Cologuard positive test.  Patient notes she had a colonoscopy last approximately 10 years ago.  She does note a history of polyps.  She does have a family history of polyps in her mother.  She has recently had a Cologuard positive test.  She notes intermittent rectal bleeding and mucus.  That has been going on for some time.  She presents today for further evaluation.  She notes that she has had difficult time with prep tolerance in the past.    In terms of further testing, I do not have any other specific recommendations and colonoscopy.  Unfortunately given the positive Cologuard test and her ongoing symptoms, I do not believe that any other test will be reasonable.  Her biggest concern about colonoscopy is the bowel preparation.  She notes she has had a difficult time completing this in the past.  I recommended low residual diet for some time preprocedure as well as extending the clear liquid diet and prep over a longer period of time for improved tolerance.  Otherwise her risk will be similar to other patients.  Plan will be for colonoscopy.  Risks and benefits of colonoscopy reviewed with patient to include, but not be limited to: anesthesia, bleeding, missed lesion and perforation requiring surgery.  Patient consents to procedure.

## 2024-04-18 NOTE — LETTER
May ROMERO Clyde  [address]  [facility] [address]        ------------------------------------------------------------------------------------------------------------  FLEXIBLE COLONOSCOPY INSTRUCTIONS  PLEASE NOTE...  AS OF JUNE 1, 2014, OUR OFFICE REQUIRES 72 HOURS NOTICE OF CANCELLATION/RESCHEDULE OF A PROCEDURE TO AVOID INCURRING A MISSED APPOINTMENT FEE.    Your Colonoscopy Procedure has been scheduled at:[address] [id number]     The Date of your Procedure is: 5/28/2024  The hospital facility will contact you the evening prior to your scheduled procedure with your arrival time.     Use the bowel preparation as directed.    Check with your family doctor if you are taking a blood thinner (Coumadin, Plavix, Xarelto, Pradaxa, Gingko biloba, Ginseng, Feverfew, Veronica's Wort).  We suggest stopping these for 3 days. Special instructions may be needed if you are taking aspirin or any aspirin-containing medication.  Check with your family physician.      If you are on DIABETIC MEDICATION (tablets or insulin) your doctor may make changes in your preparation.    Take all medications usual unless otherwise instructed.    As always, if you have any questions or concerns, feel free to call the office.  Our  staff will be happy to connect you with one of the nurses to answer any questions or address any concerns you have regarding your procedure.      Do not wear any jewelry the day of your procedure including wedding rings.    Arrangements must be made for a responsible party to drive you home from the procedure.  If you do not have a responsible family member or friend to drive you home, the procedure will not be done.  Vast or a taxi is not acceptable.    It is important you notify our office of any insurance changes prior to your procedure and, if necessary, supply us with referrals from your primary care physician.              COLONOSCOPY  PREPARATION INSTRUCTIONS    Purchase (prescription not required):  · 238 gram bottle of Miralax® (Glycolax®)  · 4 Dulcolax® (Bisacodyl) Laxative Tablets  · 64 oz. bottle of Gatorade® or your preference of a non-carbonated clear liquid - NOT RED OR PURPLE     One Day Prior to Colonoscopy Procedure  · Nothing to eat the day before your procedure, only clear liquids.  · It is important that you drink plenty of clear liquids throughout the day to prevent dehydration.    Clear Liquids include:  o Water/Iced Tea/Lemonade/Gatorade®/Black Coffee or tea (no milk or creamers  o Soft drinks: orange, ginger ale, cola, Pepsi®, Sprite®, 7Up®  o Homar-Aid® (lemonade or orange flavors only)  o Strained fruit juices without pulp such as apple, white grape, white cranberry  o Jell-O®, lemon, lime or orange (no fruit or toppings)  o Popsicles, Italian Ice (No Ice Cream, sherbets, or fruit bars)  o Chicken or beef bouillon/broth  DO NOT EAT OR DRINK ANYTHING RED OR PURPLE  DO NOT DRINK ANY ALCOHOLIC BEVERAGES  DIABETIC PATIENTS: Consult your physician    At 4:00 pm, take (2) Dulcolax® (Bisacodyl) Laxative Tablets. Swallow the tablets whole with an 8 oz. glass of water. At 8:00 pm, take the additional (2) Dulcolax® (Bisacodyl) Laxative Tablets with 8 oz. of water. The package may direct you not to exceed (2) tablets at any time but for the purpose of this examination, you should take (4) total.    Mix the 238 gm of Miralax® in 64 oz. of Gatorade® and shake the solution until the Miralax® is dissolved. You will drink half (32 oz) of this solution this evening, beginning between 4 and 6 o'clock. Drink 8 oz glassfuls at your own pace. It may take several hours to drink the solution.    Remember to stay close to toilet facilities.    DAY OF COLONOSCOPY PROCEDURE    Five (5) hours before your procedure, drink the other half (32 oz) of the Miralax®/Gatorade® mixture within a two (2) hour period. This may require you to get up very early if you  are scheduled for an early procedure.    NOTHING IS TO BE TAKEN BY MOUTH 3 HOURS PRIOR TO PROCEDURE.     If you use an inhaler, please bring it with you to your procedure.

## 2024-04-24 DIAGNOSIS — Z00.6 ENCOUNTER FOR EXAMINATION FOR NORMAL COMPARISON OR CONTROL IN CLINICAL RESEARCH PROGRAM: ICD-10-CM

## 2024-05-14 ENCOUNTER — NURSE TRIAGE (OUTPATIENT)
Age: 49
End: 2024-05-14

## 2024-05-14 NOTE — TELEPHONE ENCOUNTER
A representative from Dr. Dragan Bernabe's office called regarding mutual patient May A Ponce.  Amanda has a scheduled colonoscopy with Dr. Telles on 5/28/24 due to positive cologuard.  She had seen Dr. Bernabe in February and he wanted patient to have colonoscopy/EGD.  Dr. Bernabe's notes are in Epic.  They wanted to know if the EGD could be added on to the currently scheduled colonoscopy.  I explained that is unlikely since Dr. Telles does not do the EGD and would have to coordinate with a GI doctor that would be at the same location where patient is scheduled.  There are several options:  Patient can have colonoscopy then have EGD done an other date/time.  Patient can cancel and have Dr. Bernabe do both the colonoscopy/EGD   Patient can reschedule the colonoscopy with Dr. Telles and request that the EGD be coordinated at the rescheduled date/time.   Dr. Bernabe's office was going to call patient and go over her options.

## 2024-05-28 ENCOUNTER — ANESTHESIA EVENT (OUTPATIENT)
Dept: GASTROENTEROLOGY | Facility: HOSPITAL | Age: 49
End: 2024-05-28

## 2024-05-28 ENCOUNTER — ANESTHESIA (OUTPATIENT)
Dept: GASTROENTEROLOGY | Facility: HOSPITAL | Age: 49
End: 2024-05-28

## 2024-05-28 ENCOUNTER — HOSPITAL ENCOUNTER (OUTPATIENT)
Dept: GASTROENTEROLOGY | Facility: HOSPITAL | Age: 49
Setting detail: OUTPATIENT SURGERY
Discharge: HOME | End: 2024-05-28
Attending: COLON & RECTAL SURGERY | Admitting: COLON & RECTAL SURGERY
Payer: MEDICARE

## 2024-05-28 ENCOUNTER — NURSE TRIAGE (OUTPATIENT)
Dept: OTHER | Facility: OTHER | Age: 49
End: 2024-05-28

## 2024-05-28 VITALS
BODY MASS INDEX: 20.73 KG/M2 | HEIGHT: 69 IN | TEMPERATURE: 97 F | HEART RATE: 101 BPM | OXYGEN SATURATION: 98 % | SYSTOLIC BLOOD PRESSURE: 147 MMHG | WEIGHT: 140 LBS | DIASTOLIC BLOOD PRESSURE: 85 MMHG | RESPIRATION RATE: 18 BRPM

## 2024-05-28 DIAGNOSIS — R19.5 POSITIVE COLORECTAL CANCER SCREENING USING COLOGUARD TEST: ICD-10-CM

## 2024-05-28 PROBLEM — M43.22 CERVICAL VERTEBRAL FUSION: Status: ACTIVE | Noted: 2024-05-28

## 2024-05-28 LAB
EXT PREGNANCY TEST URINE: NEGATIVE
EXT. CONTROL: NORMAL

## 2024-05-28 PROCEDURE — 81025 URINE PREGNANCY TEST: CPT | Performed by: STUDENT IN AN ORGANIZED HEALTH CARE EDUCATION/TRAINING PROGRAM

## 2024-05-28 PROCEDURE — G0121 COLON CA SCRN NOT HI RSK IND: HCPCS | Performed by: COLON & RECTAL SURGERY

## 2024-05-28 RX ORDER — PROPOFOL 10 MG/ML
INJECTION, EMULSION INTRAVENOUS AS NEEDED
Status: DISCONTINUED | OUTPATIENT
Start: 2024-05-28 | End: 2024-05-28

## 2024-05-28 RX ORDER — KETOROLAC TROMETHAMINE 30 MG/ML
INJECTION, SOLUTION INTRAMUSCULAR; INTRAVENOUS AS NEEDED
Status: DISCONTINUED | OUTPATIENT
Start: 2024-05-28 | End: 2024-05-28

## 2024-05-28 RX ORDER — DIPHENHYDRAMINE HYDROCHLORIDE 50 MG/ML
INJECTION INTRAMUSCULAR; INTRAVENOUS AS NEEDED
Status: DISCONTINUED | OUTPATIENT
Start: 2024-05-28 | End: 2024-05-28

## 2024-05-28 RX ORDER — ONDANSETRON 2 MG/ML
INJECTION INTRAMUSCULAR; INTRAVENOUS AS NEEDED
Status: DISCONTINUED | OUTPATIENT
Start: 2024-05-28 | End: 2024-05-28

## 2024-05-28 RX ORDER — SODIUM CHLORIDE 9 MG/ML
INJECTION, SOLUTION INTRAVENOUS CONTINUOUS PRN
Status: DISCONTINUED | OUTPATIENT
Start: 2024-05-28 | End: 2024-05-28

## 2024-05-28 RX ADMIN — DIPHENHYDRAMINE HYDROCHLORIDE 12.5 MG: 50 INJECTION, SOLUTION INTRAMUSCULAR; INTRAVENOUS at 10:01

## 2024-05-28 RX ADMIN — PROPOFOL 50 MG: 10 INJECTION, EMULSION INTRAVENOUS at 10:19

## 2024-05-28 RX ADMIN — KETOROLAC TROMETHAMINE 15 MG: 30 INJECTION, SOLUTION INTRAMUSCULAR; INTRAVENOUS at 10:59

## 2024-05-28 RX ADMIN — PROPOFOL 100 MG: 10 INJECTION, EMULSION INTRAVENOUS at 10:14

## 2024-05-28 RX ADMIN — PROPOFOL 100 MG: 10 INJECTION, EMULSION INTRAVENOUS at 10:06

## 2024-05-28 RX ADMIN — PROPOFOL 30 MG: 10 INJECTION, EMULSION INTRAVENOUS at 10:24

## 2024-05-28 RX ADMIN — PROPOFOL 30 MG: 10 INJECTION, EMULSION INTRAVENOUS at 10:27

## 2024-05-28 RX ADMIN — PROPOFOL 30 MG: 10 INJECTION, EMULSION INTRAVENOUS at 10:30

## 2024-05-28 RX ADMIN — PROPOFOL 30 MG: 10 INJECTION, EMULSION INTRAVENOUS at 10:32

## 2024-05-28 RX ADMIN — PROPOFOL 30 MG: 10 INJECTION, EMULSION INTRAVENOUS at 10:21

## 2024-05-28 RX ADMIN — PROPOFOL 50 MG: 10 INJECTION, EMULSION INTRAVENOUS at 10:17

## 2024-05-28 RX ADMIN — PROPOFOL 100 MG: 10 INJECTION, EMULSION INTRAVENOUS at 10:08

## 2024-05-28 RX ADMIN — PROPOFOL 100 MG: 10 INJECTION, EMULSION INTRAVENOUS at 10:11

## 2024-05-28 RX ADMIN — SODIUM CHLORIDE: 0.9 INJECTION, SOLUTION INTRAVENOUS at 10:03

## 2024-05-28 RX ADMIN — ONDANSETRON 4 MG: 2 INJECTION INTRAMUSCULAR; INTRAVENOUS at 10:01

## 2024-05-28 NOTE — ANESTHESIA PREPROCEDURE EVALUATION
"Procedure:  COLONOSCOPY    Relevant Problems   ANESTHESIA   (+) PONV (postoperative nausea and vomiting)      GI/HEPATIC   (+) GERD (gastroesophageal reflux disease)   (+) H/O bariatric surgery   (+) Liver disorder   (+) Rectal bleeding      HEMATOLOGY   (+) Iron deficiency anemia secondary to blood loss (chronic)      NEURO/PSYCH   (+) Post-traumatic headache, not intractable      Neurology/Sleep   (+) S/P cervical spinal fusion      Rheumatology   (+) Cervical vertebral fusion      Surgery/Wound/Pain   (+) H/O gastric sleeve      FEN/Gastrointestinal   (+) Colitis        Physical Exam    Airway    Mallampati score: II  TM Distance: >3 FB  Neck ROM: limited     Dental   No notable dental hx     Cardiovascular      Pulmonary      Other Findings  post-pubertal.      Anesthesia Plan  ASA Score- 3     Anesthesia Type- IV sedation with anesthesia with ASA Monitors.         Additional Monitors:     Airway Plan:            Plan Factors-Exercise tolerance (METS): >4 METS.    Chart reviewed.  Imaging results reviewed. Existing labs reviewed. Patient summary reviewed.    Patient is not a current smoker.  Patient did not smoke on day of surgery.            Induction- intravenous.    Postoperative Plan-     Perioperative Resuscitation Plan - Level 1 - Full Code.       Informed Consent- Anesthetic plan and risks discussed with patient.  I personally reviewed this patient with the CRNA. Discussed and agreed on the Anesthesia Plan with the CRNA..        VITALS  There were no vitals taken for this visit.  BP Readings from Last 3 Encounters:   04/18/24 122/64   02/16/24 120/84   01/25/24 120/80     LABS  No results for input(s): \"WBC\", \"HGB\", \"HCT\", \"PLT\" in the last 8784 hours.  Results from Last 12 Months   Lab Units 03/01/24  0929 02/08/24  0000   SODIUM mmol/L 141 130*   POTASSIUM mmol/L 4.0 3.7   CHLORIDE mmol/L 107 98*   CO2 mmol/L 27 25   ANION GAP  7 7   BUN mg/dL 22 25   CREATININE mg/dL 0.86 0.84   CALCIUM mg/dL 9.4 9.6 " "  GLUCOSE RANDOM mg/dL 87 72     No results for input(s): \"APTT\", \"INR\", \"PTT\" in the last 8784 hours.    ECG  No results found for this or any previous visit (from the past 4464 hour(s)).  No results found for this or any previous visit.    ECHOCARDIOGRAPHY AND OTHER TESTING/IMAGING  This result has an attachment that is not available.     Left Ventricle: Left ventricle is normal in size. Systolic function is   normal with an ejection fraction of 55%. There is normal diastolic   function.    Right Ventricle: Right ventricle cavity is normal. Systolic function is   normal.    Aortic Valve: There is sclerosis. There is mild regurgitation.     Pulmonic Valve: The estimated pulmonary artery systolic pressure is   20.9 mmHg. Normal pulmonary artery pressure.     Left Ventricle   Left ventricle is normal in size. Wall thickness is normal. Systolic function is normal with an ejection fraction of 55%. Wall motion is within normal limits. There is normal diastolic function.     Right Ventricle   Right ventricle cavity is normal. Systolic function is normal.     Left Atrium   Left atrium cavity size is normal.     Right Atrium   Right atrium cavity is normal.     IVC/SVC   The inferior vena cava demonstrates a diameter of <=21 mm and collapses >50%; therefore, the right atrial pressure is estimated at 0-5 mmHg.     Mitral Valve   Mitral valve structure is normal. There is mild regurgitation. There is no evidence of mitral valve stenosis.     Tricuspid Valve   Tricuspid valve structure is normal. There is mild regurgitation. There is no evidence of tricuspid valve stenosis.     Aortic Valve   There is sclerosis. There is mild regurgitation. There is no evidence of aortic valve stenosis.     Pulmonic Valve   Pulmonic valve structure is normal. There is no regurgitation or stenosis. The estimated pulmonary artery systolic pressure is 20.9 mmHg. Normal pulmonary artery pressure.     Ascending Aorta   The aortic root appears " normal in size. The aorta was not well visualized.     ANESTHESIA RISK-BENEFIT DISCUSSION  BENEFITS OF A SPECIALIZED ANESTHESIA TEAM INCLUDE (NBK 302024, PMID 51732074):  (1) Reduce mortality and morbidity for major surgeries/procedures. (2) The team provides analgesia/sedation/amnesia/akinesia as safely as possible. (3) The team strives to reduce discomfort as safely as possible.    RISKS, AND PLANS TO MITIGATE RISKS, INCLUDE:    - Neurologic system: IntraOp awareness (Risk is ~1:1,000 - 1:14,000; PMID 14067053), Stroke (Risk ~<0.1-2% for most cases; PMID 43657139), nerve injury, vision loss, and POCD.     - Airway and Pulmonary system: Dental or mouth injury, throat pain, critical hypoxia, pneumothorax, prolonged intubation, post-op respiratory compromise.  Airway/Intubation risks and prior data: [facility]  Major ARISCAT risk factors for pulmonary complications include: none, yielding a score of 0-1= Low risk, 1.6%.  - Cardiovascular system: Hypotension, arrhythmias, cardiac injury or arrest, blood clots, bleeding, infection, vascular injuries.  Corwin's RCRI score items: none, yielding an RCRI Score of 0= 0.4% risk of MACE  Are perla-op or intra-op beta blockers indicated? (PMID 49667000): no  - FEN/GI system: Aspiration risk (~0.5% [facility] [id number]) and PONV (10-80% per Apfel score) especially if the patient has not fasted.  ASA NPO guideline compliance?: Yes  - Medication risk assessment: Allergic reactions, excessive bleeding with anticoagulant use, overdoses, drug-drug interactions, injury to a fetus or  in pregnant or breastfeeding patients, perla-procedural sedation including while driving/operating machinery.  Recent relevant medications: See MAR or Med Review  Personal or family history of anesthesia complications: yes: PONV, LFT elevation w/ GA  Pregnancy Status: N/A  - Estimate mortality risks associated with anesthesia based on ASA-PS (PMID 62466050): ASA-PS III:  1:3,500      Goal MAP intraOP >75  X2 antiemetics
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
## 2024-05-28 NOTE — ANESTHESIA POSTPROCEDURE EVALUATION
Post-Op Assessment Note    CV Status:  Stable         Mental Status:  Alert   Hydration Status:  Stable   PONV Controlled:  None   Airway Patency:  Adequate and patent     Post Op Vitals Reviewed: Yes    No anethesia notable event occurred.    Staff: CRNA                 Vitals:    05/28/24 1104   BP: 147/85   Pulse: 101   Resp: 18   Temp:    SpO2: 98%

## 2024-05-28 NOTE — TELEPHONE ENCOUNTER
Contacted on-call provider who recommended patient give herself a fleets enema at home and if she does not have one available, they can do one after arrival.    Contacted patient to make her aware of on-call provider's recommendations.  She does not have a fleets enema on hand but is agreeable to going to the procedure and having one there.

## 2024-05-28 NOTE — TELEPHONE ENCOUNTER
"Reason for Disposition   [1] Caller has URGENT question or concern AND [2] triager unable to answer question    Answer Assessment - Initial Assessment Questions  1. DATE/TIME: \"When did you have your colonoscopy?\"       Scheduled for 10 am today.     2. MAIN CONCERN: \"What is your main concern right now?\" \"What questions do you have?\"      Stool is still orange brown with some pieces. Patient is concerned that she will not be cleaned out by time of procedure.     3. ABDOMEN PAIN: \"Are you having any abdomen (belly or stomach) pain?\" If Yes, ask: \"How bad is it?\" (e.g., Scale 1-10; mild, moderate, severe).     - MILD (1-3): doesn't interfere with normal activities, abdomen soft and not tender to touch      - MODERATE (4-7): interferes with normal activities or awakens from sleep, tender to touch      - SEVERE (8-10): excruciating pain, doubled over, unable to do any normal activities        Denies     4. OTHER SYMPTOMS: \"What other symptoms are you having?\" (e.g., rectal bleeding, bloating or feeling gassy, passing gas, vomiting, dizziness, fever).      Nausea before bed but took Zofran. Nausea has since subsided.    Protocols used: Colonoscopy Symptoms and Questions-ADULT-    "

## 2024-06-03 NOTE — PROGRESS NOTES
AMB US Pelvic Non OB    Date/Time: 6/4/2024 11:00 AM    Performed by: Madeline Brown  Authorized by: Stan De Leon MD  Universal Protocol:  Consent: Verbal consent obtained.  Consent given by: patient  Timeout called at: 6/4/2024 10:42 AM.  Patient understanding: patient states understanding of the procedure being performed  Patient identity confirmed: verbally with patient    Procedure details:     SIS Procedure: No    Indications: leiomyoma      Technique:  Transvaginal US, Non-OB    Position: lithotomy exam    Uterine findings:     Length (cm): 9.04    Height (cm):  4.82    Width (cm):  5.35    Endometrial stripe: identified      Endometrium thickness (mm):  5.71  Left ovary findings:     Left ovary:  Visualized    Length (cm): 2.36    Height (cm): 1.22    Width (cm): 1.96  Right ovary findings:     Right ovary:  Visualized    Length (cm): 2.59    Height (cm): 1.17    Width (cm): 1.56  Other findings:     Free pelvic fluid: not identified      Free peritoneal fluid: not identified    Post-Procedure Details:     Impression:  Anteverted uterus demonstrates multiple fibroids. Largest are posterior intramural 2.0cm, anterior left intramural 2.3cm, and one less than 1cm lower uterine segment intramural. The right ovary demonstrates a 4.3cm simple cyst. The left ovary appears within normal limits. No free fluid.      Tolerance:  Tolerated well, no immediate complications    Complications: no complications    Additional Procedure Comments:      GE VolusonP8 [id number] transvaginal transducer Serial #[id number] was used to perform the examination today and subsequently followed with high level disinfection utilizing Trophon EPR procedure.     Ultrasound performed at:     [facility]  [address]  [address] [place] [address]  Phone  [phone]  Fax  [phone]

## 2024-06-04 ENCOUNTER — ULTRASOUND (OUTPATIENT)
Dept: OBGYN CLINIC | Facility: CLINIC | Age: 49
End: 2024-06-04
Payer: MEDICARE

## 2024-06-04 ENCOUNTER — APPOINTMENT (OUTPATIENT)
Dept: LAB | Facility: CLINIC | Age: 49
End: 2024-06-04

## 2024-06-04 ENCOUNTER — OFFICE VISIT (OUTPATIENT)
Dept: OBGYN CLINIC | Facility: CLINIC | Age: 49
End: 2024-06-04
Payer: MEDICARE

## 2024-06-04 DIAGNOSIS — Z00.6 ENCOUNTER FOR EXAMINATION FOR NORMAL COMPARISON OR CONTROL IN CLINICAL RESEARCH PROGRAM: ICD-10-CM

## 2024-06-04 DIAGNOSIS — N83.201 RIGHT OVARIAN CYST: ICD-10-CM

## 2024-06-04 DIAGNOSIS — N95.1 MENOPAUSAL SYMPTOMS: ICD-10-CM

## 2024-06-04 DIAGNOSIS — N92.4 ABNORMAL PERIMENOPAUSAL BLEEDING: Primary | ICD-10-CM

## 2024-06-04 DIAGNOSIS — N83.202 LEFT OVARIAN CYST: Primary | ICD-10-CM

## 2024-06-04 PROCEDURE — 36415 COLL VENOUS BLD VENIPUNCTURE: CPT

## 2024-06-04 PROCEDURE — 76830 TRANSVAGINAL US NON-OB: CPT | Performed by: OBSTETRICS & GYNECOLOGY

## 2024-06-04 PROCEDURE — 99213 OFFICE O/P EST LOW 20 MIN: CPT | Performed by: OBSTETRICS & GYNECOLOGY

## 2024-06-04 NOTE — PROGRESS NOTES
Assessment/Plan:      Diagnoses and all orders for this visit:    Abnormal perimenopausal bleeding    Menopausal symptoms    Right ovarian cyst          Subjective:     Patient ID: May Ponce is a 48 y.o. female.    Is a 48-year-old female who presents today for follow-up of history of ovarian cysts and perimenopausal symptoms.    Patient reports that this time that her menstrual cycles have become somewhat on predictable and are regular.  She denies any heavy periods or ones accompanied by excessive clotting.    She reports moderate discomfort and cramping.    Patient denies any significant urinary symptoms at this time including fever shakes or chills.    Patient reports fairly normal appetite.    Patient followed for a number of medical problems at this time.    Pelvic ultrasound performed at today's visit confirm the presence of a stable 4 cm right ovarian cyst.  No other significant abnormalities were noted during the study.    We will see her back in several months for follow-up interval visit.    All questions were answered in detail for her during today's visit including detailed review of the pelvic ultrasound.    Appropriate appointment will be made for follow-up at today's visit    Patient will call for any problems, questions, issues or concerns which arise for her.      Total time of today's visit was 20 minutes of which greater than 50% was spent face-to-face counseling the patient as well as coordination of care, review of chart and lab values, physical examination as well as computer entry into the [facility] medical record system.          Review of Systems   Constitutional: Negative.  Negative for appetite change, diaphoresis, fatigue, fever and unexpected weight change.   HENT: Negative.     Eyes: Negative.    Respiratory: Negative.     Cardiovascular: Negative.    Gastrointestinal: Negative.  Negative for abdominal pain, blood in stool, constipation, diarrhea, nausea and vomiting.   Endocrine:  Negative.  Negative for cold intolerance and heat intolerance.   Genitourinary: Negative.  Negative for dysuria, frequency, hematuria, urgency, vaginal bleeding, vaginal discharge and vaginal pain.   Musculoskeletal: Negative.    Skin: Negative.    Allergic/Immunologic: Negative.    Neurological: Negative.    Hematological: Negative.  Negative for adenopathy.   Psychiatric/Behavioral: Negative.           Objective:     Physical Exam  Constitutional:       Appearance: She is well-developed.   HENT:      Head: Normocephalic.   Eyes:      Pupils: Pupils are equal, round, and reactive to light.   Cardiovascular:      Rate and Rhythm: Normal rate.   Pulmonary:      Effort: Pulmonary effort is normal.   Musculoskeletal:         General: Normal range of motion.      Cervical back: Normal range of motion and neck supple.   Skin:     General: Skin is warm and dry.   Neurological:      General: No focal deficit present.      Mental Status: She is alert and oriented to person, place, and time.   Psychiatric:         Mood and Affect: Mood normal.         Behavior: Behavior normal.         Thought Content: Thought content normal.         Judgment: Judgment normal.

## 2024-06-04 NOTE — PATIENT INSTRUCTIONS
Topic: Perimenopausal bleeding pattern and right ovarian cyst    Will follow-up in approximately 3 months for ultrasound evaluation as well as clinical perimenopausal evaluation as well.    All questions answered for patient during today's visit    She will call for any problems, questions, issues or concerns which may arise for her

## 2024-06-04 NOTE — PROGRESS NOTES
Pelvic ultrasound reviewed and note is made of a 4 cm simple cyst on the right ovary.  Left ovarian cyst has resolved.    Results discussed in detail with patient    All questions were answered in detail for her    Patient experiencing changes in her menstrual pattern.  I explained that most likely this could be due to perimenopausal change.    We will see her back in 3 months for clinical follow-up.    She was told to call for any problems, questions, issues or concerns which may result during the interim.

## 2024-06-16 LAB
APOB+LDLR+PCSK9 GENE MUT ANL BLD/T: NOT DETECTED
BRCA1+BRCA2 DEL+DUP + FULL MUT ANL BLD/T: NOT DETECTED
MLH1+MSH2+MSH6+PMS2 GN DEL+DUP+FUL M: NOT DETECTED

## 2024-06-18 ENCOUNTER — NURSE TRIAGE (OUTPATIENT)
Age: 49
End: 2024-06-18

## 2024-06-18 DIAGNOSIS — R30.0 DYSURIA: Primary | ICD-10-CM

## 2024-06-18 NOTE — TELEPHONE ENCOUNTER
"Patient called with symptoms of burning and pain on urination. Denies fever, pain 4/10 with urination \"burns at the end of stream\"  Had this type of pain in the past that was UTI.  Order placed for Urinalysis and C&S.   Patient uses [facility] [place], orders faxed to [phone]  Patient advised to increase fluids, call back any increased symptoms: bleeding, severe pain,fever.  Patient verbalized understanding and thankful.    Reason for Disposition   Age > 48 years    Additional Information   Pain or burning with passing urine (urination) and female    Answer Assessment - Initial Assessment Questions  1. SYMPTOM: \"What's the main symptom you're concerned about?\" (e.g., frequency, incontinence)      Frequency and burning on urination  2. ONSET: \"When did the  burning and pain with urination  start?\"      3 days  3. PAIN: \"Is there any pain?\" If Yes, ask: \"How bad is it?\" (Scale: 1-10; mild, moderate, severe)      Constant 4  4. CAUSE: \"What do you think is causing the symptoms?\"      UTI had in the past  5. OTHER SYMPTOMS: \"Do you have any other symptoms?\" (e.g., fever, flank pain, blood in urine, pain with urination)      Tinged pink spot on panty liner,passed \" tiny thread\"  6. PREGNANCY: \"Is there any chance you are pregnant?\" \"When was your last menstrual period?\"      LMP unsure    Protocols used: Urinary Symptoms-ADULT-[place], Urination Pain - Female-ADULT-[place]    "

## 2024-06-18 NOTE — TELEPHONE ENCOUNTER
Regarding: UTI symptoms  ----- Message from Cristina YANES sent at 6/18/2024 12:39 PM EDT -----  Established -Patient calling with frequent urination and sharp pain.  Patient is available until 4pm.      Tried CTS

## 2024-06-19 LAB
GLUCOSE UR QL STRIP: NEGATIVE MG/DL
HGB UR QL STRIP: ABNORMAL MG/DL
KETONES UR QL STRIP: NEGATIVE MG/DL
LEUKOCYTE ESTERASE UR QL STRIP: 500 /UL
NITRITE UR QL STRIP: NEGATIVE
PH UR: 6 [PH] (ref 4.5–8)
PROT 24H UR-MRATE: NEGATIVE MG/DL
RBC #/AREA URNS HPF: ABNORMAL /HPF (ref 0–2)
SL AMB POCT URINE COMMENT: ABNORMAL
SP GR UR: 1.01 (ref 1–1.03)
TRANS CELLS #/AREA URNS HPF: ABNORMAL /LPF (ref 0–1)
WBC #/AREA URNS HPF: ABNORMAL /HPF (ref 0–5)

## 2024-06-21 LAB — LEGIONELLA SPEC CULT: NORMAL

## 2024-06-23 DIAGNOSIS — N30.00 ACUTE CYSTITIS WITHOUT HEMATURIA: Primary | ICD-10-CM

## 2024-06-23 RX ORDER — NITROFURANTOIN 25; 75 MG/1; MG/1
100 CAPSULE ORAL 2 TIMES DAILY
Qty: 14 CAPSULE | Refills: 0 | Status: SHIPPED | OUTPATIENT
Start: 2024-06-23 | End: 2024-06-30

## 2024-07-11 ENCOUNTER — NURSE TRIAGE (OUTPATIENT)
Age: 49
End: 2024-07-11

## 2024-07-11 DIAGNOSIS — N89.8 VAGINAL ODOR: Primary | ICD-10-CM

## 2024-07-11 DIAGNOSIS — N76.0 BV (BACTERIAL VAGINOSIS): ICD-10-CM

## 2024-07-11 DIAGNOSIS — B96.89 BV (BACTERIAL VAGINOSIS): ICD-10-CM

## 2024-07-11 NOTE — TELEPHONE ENCOUNTER
"Pt calling with copious amount of watery, yellow vaginal discharge that has an odor to it. States has some irritation and a little bit of an itch. Denies other symptoms. States UTI symptoms she was treated for a few weeks ago have improved. RN advised sounds like it is bacterial vaginosis. Can prescribe Flagyl oral antibiotics or Metrogel antibiotic vaginal gel. Pt prefers Flagyl oral antibiotics. RN sent message to provider to double check OK for patient to take Flagyl due to multiple antibiotic allergies. Pharmacy on file confirmed. No further questions.     Answer Assessment - Initial Assessment Questions  1. SYMPTOM: \"What's the main symptom you're concerned about?\" (e.g., pain, itching, dryness)      Heavy, watery yellow vaginal discharge, foul odor, irritation and itch  2. LOCATION: \"Where is the  s/s located?\" (e.g., inside/outside, left/right)      Internal  3. ONSET: \"When did the  s/s  start?\"      2 weeks  4. PAIN: \"Is there any pain?\" If Yes, ask: \"How bad is it?\" (Scale: 1-10; mild, moderate, severe)      Irritation  5. ITCHING: \"Is there any itching?\" If Yes, ask: \"How bad is it?\" (Scale: 1-10; mild, moderate, severe)      Slight  6. CAUSE: \"What do you think is causing the discharge?\" \"Have you had the same problem before? What happened then?\"      Unsure  7. OTHER SYMPTOMS: \"Do you have any other symptoms?\" (e.g., fever, itching, vaginal bleeding, pain with urination, injury to genital area, vaginal foreign body)      Denies  8. PREGNANCY: \"Is there any chance you are pregnant?\" \"When was your last menstrual period?\"      NA    Protocols used: Vaginal Symptoms-ADULT-[place]    "

## 2024-07-12 RX ORDER — METRONIDAZOLE 7.5 MG/G
GEL VAGINAL
Qty: 70 G | Refills: 1 | Status: SHIPPED | OUTPATIENT
Start: 2024-07-12

## 2024-07-12 NOTE — TELEPHONE ENCOUNTER
Spoke to patient and confirm symptoms    Suggested it most likely is BV    Prescription for MetroGel sent to pharmacy

## 2024-07-12 NOTE — TELEPHONE ENCOUNTER
Spoke to patient and discussed watery discharge and odor    Will send prescription for MetroGel to pharmacy to treat probable BV

## (undated) DEVICE — NEEDLE 25G X 1 1/2

## (undated) DEVICE — 2000CC GUARDIAN II: Brand: GUARDIAN

## (undated) DEVICE — GLOVE SRG LF STRL BGL SKNSNS 6.5 PF

## (undated) DEVICE — SUT ETHILON 3-0 PS-1 18 IN 1663G

## (undated) DEVICE — BETHLEHEM UNIVERSAL MINOR GEN: Brand: CARDINAL HEALTH

## (undated) DEVICE — TELFA NON-ADHERENT ABSORBENT DRESSING: Brand: TELFA

## (undated) DEVICE — SUT VICRYL 3-0 SH 27 IN J416H

## (undated) DEVICE — DRESSING MEPILEX AG BORDER 4 X 4 IN

## (undated) DEVICE — 3M™ STERI-STRIP™ REINFORCED ADHESIVE SKIN CLOSURES, R1547, 1/2 IN X 4 IN (12 MM X 100 MM), 6 STRIPS/ENVELOPE: Brand: 3M™ STERI-STRIP™

## (undated) DEVICE — 2963 MEDIPORE SOFT CLOTH TAPE 3 IN X 10 YD 12 RLS/CS: Brand: 3M™ MEDIPORE™

## (undated) DEVICE — GLOVE SRG LF STRL BGL SKNSNS 7.5 PF

## (undated) DEVICE — MEDI-VAC YANKAUER SUCTION HANDLE W/BULBOUS AND CONTROL VENT: Brand: CARDINAL HEALTH

## (undated) DEVICE — PLUMEPEN PRO 10FT

## (undated) DEVICE — SUT VICRYL 2-0 SH 27 IN UNDYED J417H

## (undated) DEVICE — GLOVE INDICATOR PI UNDERGLOVE SZ 7 BLUE

## (undated) DEVICE — GLOVE INDICATOR PI UNDERGLOVE SZ 6.5 BLUE

## (undated) DEVICE — INTENDED FOR TISSUE SEPARATION, AND OTHER PROCEDURES THAT REQUIRE A SHARP SURGICAL BLADE TO PUNCTURE OR CUT.: Brand: BARD-PARKER SAFETY BLADES SIZE 15, STERILE

## (undated) DEVICE — CHLORAPREP HI-LITE 26ML ORANGE

## (undated) DEVICE — SCD SEQUENTIAL COMPRESSION COMFORT SLEEVE MEDIUM KNEE LENGTH: Brand: KENDALL SCD

## (undated) DEVICE — POSITION CUSHION INSERT LARGE PRONEVIEW

## (undated) DEVICE — CRADLE EXTREMITY UNIVERSAL CONTOURED

## (undated) DEVICE — TUBING SUCTION 5MM X 12 FT

## (undated) DEVICE — GLOVE INDICATOR PI UNDERGLOVE SZ 7.5 BLUE